# Patient Record
Sex: FEMALE | Race: WHITE | Employment: FULL TIME | URBAN - METROPOLITAN AREA
[De-identification: names, ages, dates, MRNs, and addresses within clinical notes are randomized per-mention and may not be internally consistent; named-entity substitution may affect disease eponyms.]

---

## 2017-06-18 ENCOUNTER — APPOINTMENT (OUTPATIENT)
Dept: CT IMAGING | Age: 52
DRG: 392 | End: 2017-06-18
Attending: EMERGENCY MEDICINE
Payer: COMMERCIAL

## 2017-06-18 ENCOUNTER — HOSPITAL ENCOUNTER (INPATIENT)
Age: 52
LOS: 5 days | Discharge: HOME | DRG: 392 | End: 2017-06-23
Attending: EMERGENCY MEDICINE | Admitting: INTERNAL MEDICINE
Payer: COMMERCIAL

## 2017-06-18 DIAGNOSIS — K57.20 DIVERTICULITIS OF LARGE INTESTINE WITH PERFORATION AND ABSCESS WITHOUT BLEEDING: Primary | ICD-10-CM

## 2017-06-18 PROBLEM — D72.829 LEUKOCYTOSIS: Status: ACTIVE | Noted: 2017-06-18

## 2017-06-18 PROBLEM — R10.32 ABDOMINAL PAIN, ACUTE, LEFT LOWER QUADRANT: Status: ACTIVE | Noted: 2017-06-18

## 2017-06-18 PROBLEM — K57.32 DIVERTICULITIS OF SIGMOID COLON: Status: ACTIVE | Noted: 2017-06-18

## 2017-06-18 LAB
ANION GAP BLD CALC-SCNC: 12 MMOL/L (ref 5–15)
APPEARANCE UR: CLEAR
BACTERIA URNS QL MICRO: NEGATIVE /HPF
BILIRUB UR QL: NEGATIVE
BUN SERPL-MCNC: 9 MG/DL (ref 6–20)
BUN/CREAT SERPL: 9 (ref 12–20)
CALCIUM SERPL-MCNC: 8.6 MG/DL (ref 8.5–10.1)
CHLORIDE SERPL-SCNC: 102 MMOL/L (ref 97–108)
CO2 SERPL-SCNC: 24 MMOL/L (ref 21–32)
COLOR UR: ABNORMAL
CREAT SERPL-MCNC: 0.98 MG/DL (ref 0.55–1.02)
EPITH CASTS URNS QL MICRO: ABNORMAL /LPF
GLUCOSE SERPL-MCNC: 91 MG/DL (ref 65–100)
GLUCOSE UR STRIP.AUTO-MCNC: NEGATIVE MG/DL
HGB UR QL STRIP: ABNORMAL
HYALINE CASTS URNS QL MICRO: ABNORMAL /LPF (ref 0–5)
KETONES UR QL STRIP.AUTO: 40 MG/DL
LEUKOCYTE ESTERASE UR QL STRIP.AUTO: NEGATIVE
NITRITE UR QL STRIP.AUTO: NEGATIVE
PH UR STRIP: 6 [PH] (ref 5–8)
POTASSIUM SERPL-SCNC: 3.5 MMOL/L (ref 3.5–5.1)
PROT UR STRIP-MCNC: NEGATIVE MG/DL
RBC #/AREA URNS HPF: ABNORMAL /HPF (ref 0–5)
SODIUM SERPL-SCNC: 138 MMOL/L (ref 136–145)
SP GR UR REFRACTOMETRY: 1.01 (ref 1–1.03)
UROBILINOGEN UR QL STRIP.AUTO: 0.2 EU/DL (ref 0.2–1)
WBC URNS QL MICRO: ABNORMAL /HPF (ref 0–4)

## 2017-06-18 PROCEDURE — 74177 CT ABD & PELVIS W/CONTRAST: CPT

## 2017-06-18 PROCEDURE — 99283 EMERGENCY DEPT VISIT LOW MDM: CPT

## 2017-06-18 PROCEDURE — 80048 BASIC METABOLIC PNL TOTAL CA: CPT | Performed by: EMERGENCY MEDICINE

## 2017-06-18 PROCEDURE — 96360 HYDRATION IV INFUSION INIT: CPT

## 2017-06-18 PROCEDURE — 74011000258 HC RX REV CODE- 258: Performed by: EMERGENCY MEDICINE

## 2017-06-18 PROCEDURE — 96361 HYDRATE IV INFUSION ADD-ON: CPT

## 2017-06-18 PROCEDURE — 74011000258 HC RX REV CODE- 258: Performed by: INTERNAL MEDICINE

## 2017-06-18 PROCEDURE — 81001 URINALYSIS AUTO W/SCOPE: CPT | Performed by: EMERGENCY MEDICINE

## 2017-06-18 PROCEDURE — 77030032490 HC SLV COMPR SCD KNE COVD -B

## 2017-06-18 PROCEDURE — 65270000029 HC RM PRIVATE

## 2017-06-18 PROCEDURE — 36415 COLL VENOUS BLD VENIPUNCTURE: CPT | Performed by: EMERGENCY MEDICINE

## 2017-06-18 PROCEDURE — 94762 N-INVAS EAR/PLS OXIMTRY CONT: CPT

## 2017-06-18 PROCEDURE — 74011250636 HC RX REV CODE- 250/636: Performed by: INTERNAL MEDICINE

## 2017-06-18 PROCEDURE — 74011250636 HC RX REV CODE- 250/636: Performed by: EMERGENCY MEDICINE

## 2017-06-18 PROCEDURE — 74011636320 HC RX REV CODE- 636/320: Performed by: EMERGENCY MEDICINE

## 2017-06-18 RX ORDER — ACETAMINOPHEN 325 MG/1
650 TABLET ORAL
Status: DISCONTINUED | OUTPATIENT
Start: 2017-06-18 | End: 2017-06-23 | Stop reason: HOSPADM

## 2017-06-18 RX ORDER — MORPHINE SULFATE 2 MG/ML
2 INJECTION, SOLUTION INTRAMUSCULAR; INTRAVENOUS
Status: DISCONTINUED | OUTPATIENT
Start: 2017-06-18 | End: 2017-06-23 | Stop reason: HOSPADM

## 2017-06-18 RX ORDER — SODIUM CHLORIDE 0.9 % (FLUSH) 0.9 %
5-10 SYRINGE (ML) INJECTION AS NEEDED
Status: DISCONTINUED | OUTPATIENT
Start: 2017-06-18 | End: 2017-06-23 | Stop reason: HOSPADM

## 2017-06-18 RX ORDER — LEVONORGESTREL AND ETHINYL ESTRADIOL 0.1-0.02MG
1 KIT ORAL DAILY
COMMUNITY

## 2017-06-18 RX ORDER — DIPHENHYDRAMINE HYDROCHLORIDE 50 MG/ML
12.5 INJECTION, SOLUTION INTRAMUSCULAR; INTRAVENOUS
Status: DISCONTINUED | OUTPATIENT
Start: 2017-06-18 | End: 2017-06-23 | Stop reason: HOSPADM

## 2017-06-18 RX ORDER — MORPHINE SULFATE 2 MG/ML
2 INJECTION, SOLUTION INTRAMUSCULAR; INTRAVENOUS
Status: COMPLETED | OUTPATIENT
Start: 2017-06-18 | End: 2017-06-18

## 2017-06-18 RX ORDER — PROCHLORPERAZINE EDISYLATE 5 MG/ML
5 INJECTION INTRAMUSCULAR; INTRAVENOUS
Status: DISCONTINUED | OUTPATIENT
Start: 2017-06-18 | End: 2017-06-23 | Stop reason: HOSPADM

## 2017-06-18 RX ORDER — SODIUM CHLORIDE 0.9 % (FLUSH) 0.9 %
5-10 SYRINGE (ML) INJECTION EVERY 8 HOURS
Status: DISCONTINUED | OUTPATIENT
Start: 2017-06-18 | End: 2017-06-23 | Stop reason: HOSPADM

## 2017-06-18 RX ORDER — ONDANSETRON 2 MG/ML
4 INJECTION INTRAMUSCULAR; INTRAVENOUS
Status: COMPLETED | OUTPATIENT
Start: 2017-06-18 | End: 2017-06-18

## 2017-06-18 RX ORDER — POTASSIUM CHLORIDE AND SODIUM CHLORIDE 900; 300 MG/100ML; MG/100ML
INJECTION, SOLUTION INTRAVENOUS CONTINUOUS
Status: DISCONTINUED | OUTPATIENT
Start: 2017-06-18 | End: 2017-06-20

## 2017-06-18 RX ORDER — LORAZEPAM 1 MG/1
1 TABLET ORAL
Status: DISCONTINUED | OUTPATIENT
Start: 2017-06-18 | End: 2017-06-23 | Stop reason: HOSPADM

## 2017-06-18 RX ORDER — NALOXONE HYDROCHLORIDE 0.4 MG/ML
0.4 INJECTION, SOLUTION INTRAMUSCULAR; INTRAVENOUS; SUBCUTANEOUS AS NEEDED
Status: DISCONTINUED | OUTPATIENT
Start: 2017-06-18 | End: 2017-06-23 | Stop reason: HOSPADM

## 2017-06-18 RX ADMIN — Medication 2 MG: at 20:24

## 2017-06-18 RX ADMIN — Medication 2 MG: at 12:13

## 2017-06-18 RX ADMIN — ONDANSETRON 4 MG: 2 INJECTION INTRAMUSCULAR; INTRAVENOUS at 12:13

## 2017-06-18 RX ADMIN — PIPERACILLIN SODIUM,TAZOBACTAM SODIUM 3.38 G: 3; .375 INJECTION, POWDER, FOR SOLUTION INTRAVENOUS at 18:05

## 2017-06-18 RX ADMIN — PIPERACILLIN SODIUM,TAZOBACTAM SODIUM 3.38 G: 3; .375 INJECTION, POWDER, FOR SOLUTION INTRAVENOUS at 12:16

## 2017-06-18 RX ADMIN — Medication 10 ML: at 20:28

## 2017-06-18 RX ADMIN — Medication 10 ML: at 14:00

## 2017-06-18 RX ADMIN — SODIUM CHLORIDE 1000 ML: 900 INJECTION, SOLUTION INTRAVENOUS at 10:09

## 2017-06-18 RX ADMIN — SODIUM CHLORIDE AND POTASSIUM CHLORIDE: 9; 2.98 INJECTION, SOLUTION INTRAVENOUS at 14:08

## 2017-06-18 RX ADMIN — IOPAMIDOL 100 ML: 755 INJECTION, SOLUTION INTRAVENOUS at 11:20

## 2017-06-18 RX ADMIN — Medication 2 MG: at 14:15

## 2017-06-18 NOTE — ED PROVIDER NOTES
HPI Comments: 47 y.o. female with no significant past medical history who presents from urgent care clinic via private vehicle with chief complaint of lower abdominal pain. Pt reports that her Sx started 3 days ago with lower abdominal pain with urination that has progressively worsened to a constant aching lower abdominal pain that is worse with urination, movement, and coughing. Pt went to an urgent clini earlier today and was found to have an elevated WBC and was sent to the ED for further evaluation. Pt denies fever, chills, nausea, vomiting, diarrhea, constipation, CP, SOB, cough. There are no other acute medical concerns at this time. Note written by chin García, as dictated by Ary Aguilar MD 10:07 AM          The history is provided by the patient. No past medical history on file. No past surgical history on file. No family history on file. Social History     Social History    Marital status: N/A     Spouse name: N/A    Number of children: N/A    Years of education: N/A     Occupational History    Not on file. Social History Main Topics    Smoking status: Not on file    Smokeless tobacco: Not on file    Alcohol use Not on file    Drug use: Not on file    Sexual activity: Not on file     Other Topics Concern    Not on file     Social History Narrative         ALLERGIES: Review of patient's allergies indicates no known allergies. Review of Systems   Constitutional: Negative for chills and fever. HENT: Negative for ear pain and sore throat. Eyes: Negative for photophobia and pain. Respiratory: Negative for chest tightness and shortness of breath. Cardiovascular: Negative for chest pain and leg swelling. Gastrointestinal: Positive for abdominal pain. Negative for nausea and vomiting. Genitourinary: Positive for dysuria. Negative for flank pain. Musculoskeletal: Negative for back pain and neck pain. Skin: Negative for rash and wound. Neurological: Negative for dizziness, light-headedness and headaches. Vitals:    06/18/17 0951   BP: 136/81   Pulse: 97   Resp: 14   Temp: 98 °F (36.7 °C)   SpO2: 99%   Weight: 92.5 kg (204 lb)   Height: 5' 9\" (1.753 m)            Physical Exam   Constitutional: She is oriented to person, place, and time. She appears well-developed and well-nourished. No distress. HENT:   Head: Normocephalic and atraumatic. Mouth/Throat: Oropharynx is clear and moist.   Eyes: Conjunctivae and EOM are normal. Pupils are equal, round, and reactive to light. Neck: Normal range of motion. Cardiovascular: Normal rate, regular rhythm, normal heart sounds and intact distal pulses. No murmur heard. Pulmonary/Chest: Effort normal and breath sounds normal. No stridor. No respiratory distress. Abdominal: Soft. Bowel sounds are normal. There is tenderness. There is no rebound and no guarding. Musculoskeletal: Normal range of motion. She exhibits no edema, tenderness or deformity. Neurological: She is alert and oriented to person, place, and time. No cranial nerve deficit. Skin: Skin is warm and dry. She is not diaphoretic. Psychiatric: She has a normal mood and affect. Nursing note and vitals reviewed. MDM  Number of Diagnoses or Management Options  Diverticulitis of large intestine with perforation and abscess without bleeding:   Diagnosis management comments: Patient with worsening lower abdominal pain - possible UTI, though concern for appendicitis exists. Check BMP and get CT of abdomen. Check UA as well. Patient declined analgesia at this time.     CT shows likely perforated diverticulitis with abscess - admit for IV ABX and further care       Amount and/or Complexity of Data Reviewed  Clinical lab tests: reviewed and ordered  Tests in the radiology section of CPT®: ordered and reviewed  Decide to obtain previous medical records or to obtain history from someone other than the patient: yes ([facility] labs show WBC of 17K)  Discuss the patient with other providers: yes (Dr. Oleg Zamarripa - radiology  Dr. Chris Robertson and Dr. Leonor Dowell of hospitalist)  Independent visualization of images, tracings, or specimens: yes    Risk of Complications, Morbidity, and/or Mortality  Presenting problems: high  Diagnostic procedures: high  Management options: high    Patient Progress  Patient progress: stable    ED Course       Procedures    CT ABD PELV W CONT  Clinical history: Right lower quadrant abdominal pain     INDICATION: Right lower quadrant abdominal pain , per electronic medical record  Pt states having low abd pain since Thursday and was sent from [facility] for  appendicitis. Denies any nausea, vomiting, or diarrhea.     COMPARISON: None     TECHNIQUE:   Following the uneventful intravenous administration of 100 cc Isovue-370, thin  axial images were obtained through the abdomen and pelvis. Coronal and sagittal  reconstructions were generated. Oral contrast was not administered. CT dose  reduction was achieved through use of a standardized protocol tailored for this  examination and automatic exposure control for dose modulation.     FINDINGS:   There is inflammatory stranding in the deep pelvis with sigmoid colonic wall  thickening associated. Partially loculated fluid in the deep pelvis 29 x 33 mm. Not amenable to percutaneous drainage. Inflammatory changes in the right lower  quadrant are less conspicuous.     Cholelithiasis. Fecal stasis. The appendix is not definitely visualized but secondary signs of acute  appendicitis are not clearly demonstrated. Minimal hydroureter and urothelial  enhancement on the right, likely reactive. LIVER: No mass or biliary dilatation. GALLBLADDER: Unremarkable. SPLEEN: No mass. PANCREAS: No mass or ductal dilatation. ADRENALS: Unremarkable. KIDNEYS: No mass, calculus, or hydronephrosis. STOMACH: Unremarkable. SMALL BOWEL: No dilatation or wall thickening.   PERITONEUM: No ascites or pneumoperitoneum. RETROPERITONEUM: No lymphadenopathy or aortic aneurysm. URINARY BLADDER: No mass or calculus. BONES: No destructive bone lesion.        IMPRESSION  IMPRESSION:.         Inflammatory stranding in the deep pelvis adjacent to the sigmoid colon with  colonic wall thickening and minimal loculated fluid in the deep pelvis. Findings  are most consistent with sigmoid colonic diverticulitis.     Inflammatory changes in the right lower quadrant are less conspicuous. The appendix is not clearly visualized.

## 2017-06-18 NOTE — CONSULTS
Surgery Consult    Subjective:      Jazmin Mai is a 47 y.o. female who I was asked to evaluate for LLQ abd pain. She has had this for about three days . [facility] and had an elevated white count and was sent to the ER, She had a CT scan which showed sigmoid diverticulitis and she has been admitted for IV antibiotics   Patient denies any pain previously, and no previous colonoscopy,     No past medical history on file. No past surgical history on file. No family history on file.   Social History     Social History    Marital status:      Spouse name: N/A    Number of children: N/A    Years of education: N/A     Social History Main Topics    Smoking status: Never Smoker    Smokeless tobacco: Not on file    Alcohol use Yes    Drug use: Not on file    Sexual activity: Not on file     Other Topics Concern    Not on file     Social History Narrative    No narrative on file      Current Facility-Administered Medications   Medication Dose Route Frequency    sodium chloride (NS) flush 5-10 mL  5-10 mL IntraVENous Q8H    sodium chloride (NS) flush 5-10 mL  5-10 mL IntraVENous PRN    acetaminophen (TYLENOL) tablet 650 mg  650 mg Oral Q4H PRN    morphine injection 2 mg  2 mg IntraVENous Q4H PRN    naloxone (NARCAN) injection 0.4 mg  0.4 mg IntraVENous PRN    diphenhydrAMINE (BENADRYL) injection 12.5 mg  12.5 mg IntraVENous Q4H PRN    prochlorperazine (COMPAZINE) injection 5 mg  5 mg IntraVENous Q8H PRN    LORazepam (ATIVAN) tablet 1 mg  1 mg Oral BID PRN    piperacillin-tazobactam (ZOSYN) 3.375 g in 0.9% sodium chloride (MBP/ADV) 100 mL  3.375 g IntraVENous Q8H    0.9% sodium chloride with KCl 40 mEq/L infusion   IntraVENous CONTINUOUS      Allergies no known allergies    Review of Systems:REVIEW OF SYSTEMS:     []     Unable to obtain  ROS due to  []    mental status change  []    sedated   []    intubated   [x]    Total of 12 systems reviewed as follows:  Constitutional: negative fever, negative chills, negative weight loss  Eyes:   negative visual changes  ENT:   negative sore throat, tongue or lip swelling  Respiratory:  negative cough, negative dyspnea  Cards:  negative for chest pain, palpitations, lower extremity edema  GI:   negative for nausea, vomiting, diarrhea, and positive for abdominal pain  :  negative for frequency, dysuria  Integument:  negative for rash and pruritus  Heme:  negative for easy bruising and gum/nose bleeding  Musculoskel: negative for myalgias,  back pain and muscle weakness  Neuro:  negative for headaches, dizziness, vertigo  Psych:  negative for feelings of anxiety, depression     Objective:      Patient Vitals for the past 8 hrs:   BP Temp Pulse Resp SpO2   17 1546 118/62 98 °F (36.7 °C) 93 18 98 %   17 1310 126/73 98.3 °F (36.8 °C) 91 - 100 %   17 1245 124/70 - - - 98 %   17 1230 127/72 - - - 98 %   17 1218 116/77 - - - 97 %       Temp (24hrs), Av.1 °F (36.7 °C), Min:98 °F (36.7 °C), Max:98.3 °F (36.8 °C)      Physical Exam:  General:  Alert, cooperative, no distress, appears stated age. Eyes:  Conjunctivae/corneas clear. PERRL, EOMs intact. Nose: Nares normal. Septum midline. Mucosa normal. No drainage or sinus tenderness. Mouth/Throat: Lips, mucosa, and tongue normal. Teeth and gums normal.   Neck: Supple, symmetrical, trachea midline, no adenopathy, thyroid: no enlargment/tenderness/nodules, no carotid bruit and no JVD. Back:   Symmetric, no curvature. ROM normal. No CVA tenderness. Lungs:   Clear to auscultation bilaterally. Heart:  Regular rate and rhythm, S1, S2 normal, no murmur, click, rub or gallop. Abdomen:   Soft, tender in LLQ with no guarding or rebound. Bowel sounds normal. No masses,  No organomegaly. Extremities: Extremities normal, atraumatic, no cyanosis or edema. Pulses: 2+ and symmetric all extremities.    Skin: Skin color, texture, turgor normal. No rashes or lesions   Lymph nodes: Cervical, supraclavicular, and axillary nodes normal.     Labs: No results for input(s): WBC, HGB, HCT, PLT, HGBEXT, HCTEXT, PLTEXT in the last 72 hours. Recent Labs      06/18/17   1005   NA  138   K  3.5   CL  102   CO2  24   GLU  91   BUN  9   CREA  0.98   CA  8.6     No results for input(s): INR in the last 72 hours. No lab exists for component: INREXT      Assessment:     Findings and CT scan consistent with diverticulitis with no drainable abscess   Has some fluid in the pelvis which could be inflammatory    Plan:     No indication for surgery currently  Pelvic fluid collection does not need drainage  I have discussed current management and long term care once she leaves hospital   She will need a colonoscopy when she has recovered from this acute episode.     Signed By: Donah Cogan, MD     June 18, 2017

## 2017-06-18 NOTE — ROUTINE PROCESS
Bedside and Verbal shift change report given to Clayton Whitehead (oncoming nurse) by Roxanne Nguyen RN (offgoing nurse). Report included the following information SBAR, Kardex and Recent Results.

## 2017-06-18 NOTE — ED TRIAGE NOTES
Pt states having low abd pain since Thursday and was sent from [facility] for appendicitis. Denies any nausea, vomiting, or diarrhea.

## 2017-06-18 NOTE — PROGRESS NOTES
[facility]: MED RECONCILIATION    Comments/Recommendations:   Patient has her birth control with her. Medication(s) ADDED to PTA list:  1. none    Medication(s) REMOVED from PTA list:  1. none    Medication(s) ADJUSTED on PTA list:  1. none      Information obtained from: Rx Query/Patient    Significant PMH/Disease States: No past medical history on file. Chief Complaint for this Admission:   Chief Complaint   Patient presents with    Abdominal Pain         Allergies: Review of patient's allergies indicates no known allergies. Prior to Admission Medications:     Medication Documentation Review Audit       Reviewed by Jazlyn Iqbal PHARMD (Pharmacist) on 06/18/17 at 1344         Medication Sig Documenting Provider Last Dose Status Taking?      levonorgestrel-ethinyl estradiol (VIENVA) 0.1-20 mg-mcg tab Take 1 Tab by mouth daily.  Historical Provider 6/17/2017 am Active Yes                        ABHISHEK Enriquez   Contact: [phone]

## 2017-06-18 NOTE — IP AVS SNAPSHOT
Yumiko Anderson 
 
 
 [address] 
[phone] Patient: Kusum Grey MRN: [id number] :1965 You are allergic to the following No active allergies Recent Documentation Height Weight BMI OB Status Smoking Status 1.753 m 92.5 kg 30.13 kg/m2 Having regular periods Never Smoker Emergency Contacts Name Discharge Info Relation Home Work Antonio Lemus   [phone] About your hospitalization You were admitted on:  2017 You last received care in the:  [facility] 4M POST SURG ORT 1 You were discharged on:  2017 Unit phone number:  [phone] Why you were hospitalized Your primary diagnosis was:  Diverticulitis Of Sigmoid Colon Your diagnoses also included:  Abdominal Pain, Acute, Left Lower Quadrant, Leukocytosis Providers Seen During Your Hospitalizations Provider Role Specialty Primary office phone Marley Sharma MD Attending Provider Emergency Medicine [phone] Yanci Kim MD Attending Provider Internal Medicine [phone] Milli Watson MD Attending Provider Internal Medicine [phone] Miguel Ángel Liang MD Attending Provider Internal Medicine [phone] Your Primary Care Physician (PCP) Primary Care Physician Office Phone Office Fax Olga [facility] [phone] Follow-up Information Follow up With Details Comments Contact Info Nicholas Waters MD  Call to schedule a routine colonoscopy after recovery from current illness Mariluz Perales [address] [place] [address] 
[phone] Denia Jean MD   [address] [address] 
[phone] Nicholas Waters MD Schedule an appointment as soon as possible for a visit in 5 weeks for colonoscopy 217 [facility] [phone] [address] 
[phone] Current Discharge Medication List  
  
START taking these medications Dose & Instructions Dispensing Information Comments Morning Noon Evening Bedtime  
 amoxicillin-clavulanate 875-125 mg per tablet Commonly known as:  AUGMENTIN Your last dose was: Your next dose is:    
   
   
 Dose:  1 Tab Take 1 Tab by mouth every twelve (12) hours for 2 days. Quantity:  4 Tab Refills:  0 CONTINUE these medications which have NOT CHANGED Dose & Instructions Dispensing Information Comments Morning Noon Evening Bedtime VIENVA 0.1-20 mg-mcg Tab Generic drug:  levonorgestrel-ethinyl estradiol Your last dose was: Your next dose is:    
   
   
 Dose:  1 Tab Take 1 Tab by mouth daily. Refills:  0 Where to Get Your Medications Information on where to get these meds will be given to you by the nurse or doctor. ! Ask your nurse or doctor about these medications  
  amoxicillin-clavulanate 875-125 mg per tablet Discharge Instructions Patient Discharge Instructions Robert Brizuela / [id number] : 1965 Admitted 2017 Discharged: 2017 Primary Diagnoses Problem List as of 2017  Never Reviewed Codes Class Noted - Resolved * (Principal)Diverticulitis of sigmoid colon Abdominal pain, acute, left lower quadrant Leukocytosis Take Home Medications · It is important that you take the medication exactly as they are prescribed. · Keep your medication in the bottles provided by the pharmacist and keep a list of the medication names, dosages, and times to be taken in your wallet. · Do not take other medications without consulting your doctor. What to do at [facility] Recommended diet: Increase noncaffeinated fluids and bland for 2 weeks Recommended activity: Activity as tolerated If you experience worse pain or other symptoms, please follow up with GI or go to ER. Follow-up with your PCP in a few weeks, and GI in 6 weeks Diverticulitis: Care Instructions Your Care Instructions Diverticulitis occurs when pouches form in the wall of the colon and become inflamed or infected. It can be very painful. Doctors aren't sure what causes diverticulitis. There is no proof that foods such as nuts, seeds, or berries cause it or make it worse. A low-fiber diet may cause the colon to work harder to push stool forward. Pouches may form because of this extra work. It may be hard to think about healthy eating while you're in pain. But as you recover, you might think about how you can use healthy eating for overall better health. Healthy eating may help you avoid future attacks. Follow-up care is a key part of your treatment and safety. Be sure to make and go to all appointments, and call your doctor if you are having problems. It's also a good idea to know your test results and keep a list of the medicines you take. How can you care for yourself at home? · Drink plenty of fluids, enough so that your urine is light yellow or clear like water. If you have kidney, heart, or liver disease and have to limit fluids, talk with your doctor before you increase the amount of fluids you drink. · Stick to liquids or a bland diet (plain rice, bananas, dry toast or crackers, applesauce) until you are feeling better. Then you can return to regular foods and gradually increase the amount of fiber in your diet. · Use a heating pad set on low on your belly to relieve mild cramps and pain. · Get extra rest until you are feeling better. · Be safe with medicines. Read and follow all instructions on the label. ¨ If the doctor gave you a prescription medicine for pain, take it as prescribed. ¨ If you are not taking a prescription pain medicine, ask your doctor if you can take an over-the-counter medicine. · If your doctor prescribed antibiotics, take them as directed. Do not stop taking them just because you feel better. You need to take the full course of antibiotics. To prevent future attacks of diverticulitis · Avoid constipation: 
¨ Include fruits, vegetables, beans, and whole grains in your diet each day. These foods are high in fiber. ¨ Drink plenty of fluids, enough so that your urine is light yellow or clear like water. If you have kidney, heart, or liver disease and have to limit fluids, talk with your doctor before you increase the amount of fluids you drink. ¨ Get some exercise every day. Build up slowly to 30 to 60 minutes a day on 5 or more days of the week. ¨ Take a fiber supplement, such as Citrucel or Metamucil, every day if needed. Read and follow all instructions on the label. ¨ Schedule time each day for a bowel movement. Having a daily routine may help. Take your time and do not strain when having a bowel movement. When should you call for help? Call 911 anytime you think you may need emergency care. For example, call if: 
· You passed out (lost consciousness). · You vomit blood or what looks like coffee grounds. · You pass maroon or very bloody stools. Call your doctor now or seek immediate medical care if: 
· You have severe pain or swelling in your belly. · You have a new or higher fever. · You cannot keep down fluids or medicines. · You have new pain that gets worse when you move or cough. Watch closely for changes in your health, and be sure to contact your doctor if: · The symptoms you had when you first started feeling sick come back. · You do not get better as expected. Where can you learn more? Go to http://jeanette-albaro.info/. Enter H901 in the search box to learn more about \"Diverticulitis: Care Instructions. \" Current as of: August 9, 2016 Content Version: 11.3 © 5692-4746 [employer].  Care instructions adapted under license by [address] (which disclaims liability or warranty for this information). If you have questions about a medical condition or this instruction, always ask your healthcare professional. [address] any warranty or liability for your use of this information. Information obtained by : 
I understand that if any problems occur once I am at home I am to contact my physician. I understand and acknowledge receipt of the instructions indicated above. Physician's or R.N.'s Signature                                                                  Date/Time Patient or Representative Signature                                                          Date/Time Discharge Orders None [facility] Announcement We are excited to announce that we are making your provider's discharge notes available to you in [facility]. You will see these notes when they are completed and signed by the physician that discharged you from your recent hospital stay. If you have any questions or concerns about any information you see in [facility], please call the Health Information Department where you were seen or reach out to your Primary Care Provider for more information about your plan of care. Introducing [facility]! Wai Tripp introduces [facility] patient portal. Now you can access parts of your medical record, email your doctor's office, and request medication refills online. 1. In your internet browser, go to [email or web address] 2. Click on the First Time User? Click Here link in the Sign In box. You will see the New Member Sign Up page. 3. Enter your [employer] Access Code exactly as it appears below. You will not need to use this code after youve completed the sign-up process. If you do not sign up before the expiration date, you must request a new code. · [employer] Access Code: [email or web address] Expires: 9/17/2017 10:32 AM 
 
4. Enter the last four digits of your Social Security Number (xxxx) and Date of Birth (mm/dd/yyyy) as indicated and click Submit. You will be taken to the next sign-up page. 5. Create a [employer] ID. This will be your [employer] login ID and cannot be changed, so think of one that is secure and easy to remember. 6. Create a [employer] password. You can change your password at any time. 7. Enter your Password Reset Question and Answer. This can be used at a later time if you forget your password. 8. Enter your e-mail address. You will receive e-mail notification when new information is available in [address]. 9. Click Sign Up. You can now view and download portions of your medical record. 10. Click the Download Summary menu link to download a portable copy of your medical information. If you have questions, please visit the Frequently Asked Questions section of the [employer] website. Remember, [employer] is NOT to be used for urgent needs. For medical emergencies, dial 911. Now available from your iPhone and Android! General Information Please provide this summary of care documentation to your next provider. Patient Signature:  ____________________________________________________________ Date:  ____________________________________________________________  
  
Josh Hai Provider Signature:  ____________________________________________________________ Date:  ____________________________________________________________

## 2017-06-18 NOTE — ROUTINE PROCESS
Primary Nurse Nanette Julio RN and Harley RN performed a dual skin assessment on this patient No impairment noted  Shawn score is 23

## 2017-06-18 NOTE — H&P
[facility]  [address] [facility], [facility] [address]  [phone]    Admission History and Physical      NAME:              Tung Farrell   :   1965   MRN:  [id number]     PCP:  Aide Alvarado MD     Date:     2017     Chief  Complaint: Abdominal pain    History Of Presenting Illness:       Ms. Sang Cramer is a 47 y.o. female who is being admitted for acute diverticulitis of sigmoid colon. Ms. Sang Cramer presented to our Emergency Department today complaining of a moderately severe left lower aching and persistent abdominal pain for three days. Worse with coughing. Relieved by IV morphine given in the ED. No vomiting. Seen an an Urgent care and her WBC count was 17,000. An abdominal CT scan done here showed acute diverticulitis with a possible abscess. The patient will be admitted to hospital for further management. Allergies no known allergies    Prior to Admission medications    Medication Sig Start Date End Date Taking? Authorizing Provider   OTHER Indications: birth control   Yes Phys Other, MD     Past medical history: neg for DM, HTN, hyperlipidemia or any chronic illness     Past surgical history: none     Social History   Substance Use Topics    Smoking status: Never smoked     Smokeless tobacco: Not on file    Alcohol use No alcohol intake      Family history: Mother had a hx of diverticulitis. No hx oc cancer     Review of Systems:    Constitutional ROS: no fever, chills, rigors or night sweats  Respiratory ROS: no cough, sputum, hemoptysis, dyspnea or pleuritic pain. Cardiovascular ROS: no chest pain, palpitations, orthopnea, PND or syncope  Endocrine ROS: no polydispsia, polyuria, heat or cold intolerance or major weight change.   Gastrointestinal ROS: no dysphagia, nausea, vomiting or diarrhea    Genito-Urinary ROS: + dysuria, + frequency, hematuria, retention or flank pain  Musculoskeletal ROS: no joint pain, swelling or muscular tenderness  Neurological ROS: no headache, confusion, focal weakness or any other neurological symptoms  Psychiatric ROS: no depression, anxiety, mood swings  Dermatological ROS: no rash, pruritis, or urticaria  Heme-Lymph ROS: no swollen glands, bleeding    Examination:    Constitutional:    Visit Vitals    /81 (BP 1 Location: Right arm, BP Patient Position: Sitting)    Pulse 97    Temp 98 °F (36.7 °C)    Resp 14    Ht 5' 9\" (1.753 m)    Wt 92.5 kg (204 lb)    LMP 06/04/2017    SpO2 99%    BMI 30.13 kg/m2         General:  Weak and ill looking patient in no acute distress  Eyes: Pink conjunctivae, PERRLA with no discharge. Normal eye movements  Ear, Nose, Mouth & Throat: No ottorrhea, rhinorrhea, non tender sinuses, dry mucous membranes  Respiratory:  No accessory muscle use, clear breath sounds without crackles or wheezes  Cardiovascular:  No JVD or murmurs, regular and normal S1, S2 without thrills, bruits or peripheral edema. Capillary refil+, good distal pulses  GI & :  Soft abdomen, lower abdominal tenderness, rebound with some guarding. Normoactive bowel sounds with no palpable organomegaly  Heme:  No cervical or axillary adenopathy. Musculoskeletal:  No cyanosis, clubbing, atrophy or deformities  Skin:  No rashes, bruising or ulcers   Neurological: Awake and alert, speech is clear, CNs 2-12 are grossly intact and otherwise non focal  Psychiatric:  Has a good insight and is oriented x 3  ________________________________________________________________________    Data Review:    Labs:    No results for input(s): WBC, HGB, HCT, PLT, HGBEXT, HCTEXT, PLTEXT in the last 72 hours.   Recent Labs      06/18/17   1005   NA  138   K  3.5   CL  102   CO2  24   GLU  91   BUN  9   CREA  0.98   CA  8.6     No components found for: GLPOC  No results for input(s): PH, PCO2, PO2, HCO3, FIO2 in the last 72 hours. No results for input(s): INR in the last 72 hours. No lab exists for component: INREXT    Radiological Studies:      Personally reviewed CT scan abdomen and pelvis - Inflammatory stranding in the deep pelvis adjacent to the sigmoid colon with  colonic wall thickening and minimal loculated fluid in the deep pelvis. Findings are most consistent with sigmoid colonic diverticulitis. Inflammatory changes in the right lower quadrant are less conspicuous. Appendix is not clearly visualized. I have also reviewed available labs from Urgent care      Assessment & Impression:     Ms. Guevara Nielsen is a 47 y.o. female being evaluated for:     Principal Problem:    Diverticulitis of sigmoid colon (6/18/2017)    Active Problems:    Abdominal pain, acute, left lower quadrant (6/18/2017)      Leukocytosis (6/18/2017)         Plan of management:    Diverticulitis of sigmoid colon (0/07/3894): likely complicated with abscess formation. Admit to hospital. Keep NPO. Hydrate with IVFs. IV Morphine. Start IV Zosyn and consult general surgery    Abdominal pain, acute, left lower quadrant (6/18/2017): likely from diverticulitis. Treat as above and follow clinically    Leukocytosis (6/18/2017): due to diverticulitis. NOT severe sepsis. Repeat labs in AM. Monitor clinically    Hypokalemia POA: borderline low K+.  Replete with IVFs     Code Status:  Full    Surrogate decision maker: Family    Risk of deterioration: high      Total time spent for the care of the patient: [address] [facility] discussed with: Patient, Family, Nursing Staff and Dr Katarina Marie    Discussed:  Code Status, Care Plan and D/C Planning    Prophylaxis:  SCD's    Probable Disposition:  Home w/Family           ___________________________________________________    Attending Physician: Johnna Holt MD

## 2017-06-18 NOTE — ED NOTES
TRANSFER - OUT REPORT:    Verbal report given to [facility] RN(name) on Donny Almazan  being transferred to [facility](unit) for routine progression of care       Report consisted of patients Situation, Background, Assessment and   Recommendations(SBAR). Information from the following report(s) SBAR was reviewed with the receiving nurse. Lines:   Peripheral IV 06/18/17 Left Antecubital (Active)   Site Assessment Clean, dry, & intact 6/18/2017 10:04 AM   Phlebitis Assessment 0 6/18/2017 10:04 AM   Infiltration Assessment 0 6/18/2017 10:04 AM   Dressing Status Clean, dry, & intact 6/18/2017 10:04 AM        Opportunity for questions and clarification was provided.       Patient transported with:   Ximena Foster

## 2017-06-19 LAB
ALBUMIN SERPL BCP-MCNC: 2.8 G/DL (ref 3.5–5)
ALBUMIN/GLOB SERPL: 0.7 {RATIO} (ref 1.1–2.2)
ALP SERPL-CCNC: 91 U/L (ref 45–117)
ALT SERPL-CCNC: 29 U/L (ref 12–78)
ANION GAP BLD CALC-SCNC: 16 MMOL/L (ref 5–15)
AST SERPL W P-5'-P-CCNC: 18 U/L (ref 15–37)
BASOPHILS # BLD AUTO: 0 K/UL (ref 0–0.1)
BASOPHILS # BLD: 0 % (ref 0–1)
BILIRUB SERPL-MCNC: 1 MG/DL (ref 0.2–1)
BUN SERPL-MCNC: 10 MG/DL (ref 6–20)
BUN/CREAT SERPL: 11 (ref 12–20)
CALCIUM SERPL-MCNC: 8.2 MG/DL (ref 8.5–10.1)
CHLORIDE SERPL-SCNC: 109 MMOL/L (ref 97–108)
CO2 SERPL-SCNC: 19 MMOL/L (ref 21–32)
CREAT SERPL-MCNC: 0.91 MG/DL (ref 0.55–1.02)
EOSINOPHIL # BLD: 0 K/UL (ref 0–0.4)
EOSINOPHIL NFR BLD: 0 % (ref 0–7)
ERYTHROCYTE [DISTWIDTH] IN BLOOD BY AUTOMATED COUNT: 14 % (ref 11.5–14.5)
GLOBULIN SER CALC-MCNC: 3.9 G/DL (ref 2–4)
GLUCOSE SERPL-MCNC: 80 MG/DL (ref 65–100)
HCT VFR BLD AUTO: 37.2 % (ref 35–47)
HGB BLD-MCNC: 12.3 G/DL (ref 11.5–16)
LYMPHOCYTES # BLD AUTO: 13 % (ref 12–49)
LYMPHOCYTES # BLD: 2 K/UL (ref 0.8–3.5)
MCH RBC QN AUTO: 28.2 PG (ref 26–34)
MCHC RBC AUTO-ENTMCNC: 33.1 G/DL (ref 30–36.5)
MCV RBC AUTO: 85.3 FL (ref 80–99)
MONOCYTES # BLD: 1.4 K/UL (ref 0–1)
MONOCYTES NFR BLD AUTO: 9 % (ref 5–13)
NEUTS SEG # BLD: 12.6 K/UL (ref 1.8–8)
NEUTS SEG NFR BLD AUTO: 78 % (ref 32–75)
PLATELET # BLD AUTO: 224 K/UL (ref 150–400)
POTASSIUM SERPL-SCNC: 3.8 MMOL/L (ref 3.5–5.1)
PROT SERPL-MCNC: 6.7 G/DL (ref 6.4–8.2)
RBC # BLD AUTO: 4.36 M/UL (ref 3.8–5.2)
SODIUM SERPL-SCNC: 144 MMOL/L (ref 136–145)
WBC # BLD AUTO: 16.1 K/UL (ref 3.6–11)

## 2017-06-19 PROCEDURE — 74011250636 HC RX REV CODE- 250/636: Performed by: INTERNAL MEDICINE

## 2017-06-19 PROCEDURE — 51798 US URINE CAPACITY MEASURE: CPT

## 2017-06-19 PROCEDURE — 65270000029 HC RM PRIVATE

## 2017-06-19 PROCEDURE — 74011000258 HC RX REV CODE- 258: Performed by: INTERNAL MEDICINE

## 2017-06-19 PROCEDURE — 36415 COLL VENOUS BLD VENIPUNCTURE: CPT | Performed by: INTERNAL MEDICINE

## 2017-06-19 PROCEDURE — 85025 COMPLETE CBC W/AUTO DIFF WBC: CPT | Performed by: INTERNAL MEDICINE

## 2017-06-19 PROCEDURE — 80053 COMPREHEN METABOLIC PANEL: CPT | Performed by: INTERNAL MEDICINE

## 2017-06-19 RX ADMIN — SODIUM CHLORIDE AND POTASSIUM CHLORIDE: 9; 2.98 INJECTION, SOLUTION INTRAVENOUS at 15:48

## 2017-06-19 RX ADMIN — Medication 2 MG: at 12:35

## 2017-06-19 RX ADMIN — Medication 2 MG: at 17:35

## 2017-06-19 RX ADMIN — Medication 10 ML: at 05:00

## 2017-06-19 RX ADMIN — Medication 2 MG: at 08:26

## 2017-06-19 RX ADMIN — PIPERACILLIN SODIUM,TAZOBACTAM SODIUM 3.38 G: 3; .375 INJECTION, POWDER, FOR SOLUTION INTRAVENOUS at 17:34

## 2017-06-19 RX ADMIN — Medication 2 MG: at 00:44

## 2017-06-19 RX ADMIN — PIPERACILLIN SODIUM,TAZOBACTAM SODIUM 3.38 G: 3; .375 INJECTION, POWDER, FOR SOLUTION INTRAVENOUS at 01:52

## 2017-06-19 RX ADMIN — SODIUM CHLORIDE AND POTASSIUM CHLORIDE: 9; 2.98 INJECTION, SOLUTION INTRAVENOUS at 04:52

## 2017-06-19 RX ADMIN — PIPERACILLIN SODIUM,TAZOBACTAM SODIUM 3.38 G: 3; .375 INJECTION, POWDER, FOR SOLUTION INTRAVENOUS at 09:49

## 2017-06-19 NOTE — PROGRESS NOTES
General Surgery Daily Progress Note    Patient: Shoshana Samaniego MRN: [id number]  SSN: [id number]    YOB: 1965  Age: 47 y.o. Sex: female      Admit Date: 6/18/2017    Subjective:   Pain unchanged since admission. Denies N/V. No BM or flatus.      Current Facility-Administered Medications   Medication Dose Route Frequency    sodium chloride (NS) flush 5-10 mL  5-10 mL IntraVENous Q8H    sodium chloride (NS) flush 5-10 mL  5-10 mL IntraVENous PRN    acetaminophen (TYLENOL) tablet 650 mg  650 mg Oral Q4H PRN    morphine injection 2 mg  2 mg IntraVENous Q4H PRN    naloxone (NARCAN) injection 0.4 mg  0.4 mg IntraVENous PRN    diphenhydrAMINE (BENADRYL) injection 12.5 mg  12.5 mg IntraVENous Q4H PRN    prochlorperazine (COMPAZINE) injection 5 mg  5 mg IntraVENous Q8H PRN    LORazepam (ATIVAN) tablet 1 mg  1 mg Oral BID PRN    piperacillin-tazobactam (ZOSYN) 3.375 g in 0.9% sodium chloride (MBP/ADV) 100 mL  3.375 g IntraVENous Q8H    0.9% sodium chloride with KCl 40 mEq/L infusion   IntraVENous CONTINUOUS        Objective:      06/17 1901 - 06/19 0700  In: 618.8 [I.V.:618.8]  Out: -   Patient Vitals for the past 8 hrs:   BP Temp Pulse Resp SpO2   06/19/17 1140 120/70 98.8 °F (37.1 °C) 88 18 99 %   06/19/17 0807 117/75 98.2 °F (36.8 °C) 92 18 98 %   06/19/17 0450 114/73 99.6 °F (37.6 °C) 91 18 96 %       Physical Exam:  General: Alert, cooperative, NAD  Lungs: Unlabored  Heart:  Regular rate and rhythm  Abdomen: Soft, left mid abdominal tenderness, mildly distended. + bowel sounds  Extremities: Warm, moves all, no edema  Skin:  Warm and dry, no rash    Labs: Recent Labs      06/19/17   0454   WBC  16.1*   HGB  12.3   HCT  37.2   PLT  224     Recent Labs      06/19/17   0454   NA  144   K  3.8   CL  109*   CO2  19*   GLU  80   BUN  10   CREA  0.91   CA  8.2*   ALB  2.8*   TBILI  1.0   SGOT  18   ALT  29       Assessment / Plan:   · Acute sigmoid diverticulitis w/ contained perforation  · Continue bowel rest, IVF, ABX  · Would not advance diet until pain has improved  · Ambulate in halls  · No indication for surgery

## 2017-06-19 NOTE — PROGRESS NOTES
NUTRITION   RD Screen & Diet education     Diet: NPO  Body mass index is 30.13 kg/(m^2). Skin: Intact  PO Intake: NPO  No data found. Estimated Daily Nutrition Requirements:  Kcals:  1611-2071 (20-23 kcals/kg x actual BW)                Protein: 74-92 (0.8-1.0 g/kg x actual BW)                Fluid:       Consult received for diet education. MST referral received for EN/TPN PTA.  47 yof admitted for acute diverticulitis. Surgery following - no surgery plan at this time. IV abx and fluids. Currently NPO w/ plans to wait for subsided pain p/t diet advancement. Visited pt this afternoon. Reports improvement in pain. Denied EN/TPN PTA. Baseline appetite is good. Obese per BMI. Provided Low Fiber diet education for diverticulitis symptoms. Follow until pain and inflammation subsides, usually ~3 wks. Provided maintenance diet education - High Fiber. Recommendations and tips for re-introducing fiber back in to diet. Pt receptive and agreeable to diet information. Will continue to monitor plan of care, diet advancement, PO. Nutrition Diagnosis: Alteration in GI function related to alteration in GI tract structure/function as evidenced by diverticulitis dx and NPO  Nutrition Intervention: General, healthful diet; nutrition education  Goal: Advanced diet w/ no s/s of intolerance in the next 1-3 days  Monitoring and Evaluation: diet advancement, PO     RD will continue to monitor and will f/u for further nutrition assessment and intervention as appropriate.     Education & Discharge Needs:   [x] Nutrition related discharge needs addressed:     [] Supplements (on d/c instruction &/or coupons provided)  [x] Education    []No nutrition related discharge needs at this time     Cultural, Jehovah's witness and ethnic food preferences identified    [x]None   [] Yes     Nadia Srinivasan RD

## 2017-06-19 NOTE — PROGRESS NOTES
[address] [place] [address], [place], [address] [place] [address]  [phone]      Medical Progress Note      NAME:         Dio Joshi   :        1965  MRM:        [id number]    Date:          2017      Subjective: Patient has been seen and examined as a follow up for acute diverticulitis. Chart, labs, diagnostics reviewed. She has less aching lower abdominal discomfort but better with pain medications. She denies any fever or chills. No nausea or vomiting. Objective:    Vital Signs:    Visit Vitals    /75 (BP 1 Location: Right arm, BP Patient Position: Head of bed elevated (Comment degrees))    Pulse 92    Temp 98.2 °F (36.8 °C)    Resp 18    Ht 5' 9\" (1.753 m)    Wt 92.5 kg (204 lb)    LMP 2017    SpO2 98%    BMI 30.13 kg/m2        Intake/Output Summary (Last 24 hours) at 17 1141  Last data filed at 17 1717   Gross per 24 hour   Intake           618.75 ml   Output                0 ml   Net           618.75 ml        Physical Examination:    General:   Weak looking and not in any acute distress   Eyes:   pink conjunctivae, PERRLA with no discharge. ENT:   no ottorrhea or rhinorrhea with moist mucous membranes  Neck: no masses, thyroid non-tender and trachea central.  Pulm:  no accessory muscle use, clear breath sounds without crackles or wheezes  Card:  no JVD or murmurs, has regular and normal S1, S2 without thrills, bruits or peripheral edema  Abd:  Soft, lower abdominal discomfort, less guarding, normoactive bowel sounds with no palpable organomegaly  Musc:  No cyanosis, clubbing, atrophy or deformities. Skin:  No rashes, bruising or ulcers. Neuro: Awake and alert.  Generally a non focal exam. Follows commands appropriately  Psych:  Has a good insight and is oriented x 3    Current Facility-Administered Medications   Medication Dose Route Frequency    sodium chloride (NS) flush 5-10 mL  5-10 mL IntraVENous Q8H    sodium chloride (NS) flush 5-10 mL  5-10 mL IntraVENous PRN    acetaminophen (TYLENOL) tablet 650 mg  650 mg Oral Q4H PRN    morphine injection 2 mg  2 mg IntraVENous Q4H PRN    naloxone (NARCAN) injection 0.4 mg  0.4 mg IntraVENous PRN    diphenhydrAMINE (BENADRYL) injection 12.5 mg  12.5 mg IntraVENous Q4H PRN    prochlorperazine (COMPAZINE) injection 5 mg  5 mg IntraVENous Q8H PRN    LORazepam (ATIVAN) tablet 1 mg  1 mg Oral BID PRN    piperacillin-tazobactam (ZOSYN) 3.375 g in 0.9% sodium chloride (MBP/ADV) 100 mL  3.375 g IntraVENous Q8H    0.9% sodium chloride with KCl 40 mEq/L infusion   IntraVENous CONTINUOUS        Laboratory data and review:    Recent Labs      06/19/17   0454   WBC  16.1*   HGB  12.3   HCT  37.2   PLT  224     Recent Labs      06/19/17   0454  06/18/17   1005   NA  144  138   K  3.8  3.5   CL  109*  102   CO2  19*  24   GLU  80  91   BUN  10  9   CREA  0.91  0.98   CA  8.2*  8.6   ALB  2.8*   --    SGOT  18   --    ALT  29   --      No components found for: GLPOC    Assessment and Plan:    Diverticulitis of sigmoid colon (6/18/2017): no abscess as per surgery. Still symptomatic. Continue to keep NPO. Hydrate with IVFs. IV Morphine. Continue IV Zosyn and nutritional education. Will need an out patient colonoscopy once recovered from acute illness.      Abdominal pain, acute, left lower quadrant (6/18/2017): likely from diverticulitis. Treat as above and monitor  clinically     Leukocytosis (6/18/2017): due to diverticulitis. NOT severe sepsis. Monitor clinically     Hypokalemia POA: borderline low K+.  Repleted      Total time spent for the patient's care: [address] discussed with: Patient and Nursing Staff    Discussed:  Care Plan    Prophylaxis:  SCD's    Anticipated Disposition:  Home w/Family           ___________________________________________________    Attending Physician:   Edyta Gold MD

## 2017-06-19 NOTE — PROGRESS NOTES
Verbal shift change report given to Tom Evangelista (oncoming nurse) by GURU Quinones (offgoing nurse). Report given with SBAR, Filiberto and MAR.

## 2017-06-19 NOTE — PROGRESS NOTES
6/19/2017 10:56 AM Met with pt. Charted address and phone number confirmed. Pt lives with her  and 3 children in [address] [place] [address]. Pt has no history of home health and does not own any DME. Pt was independent with adls and driving prior to admission. Pt has rx coverage and fills her scripts at [facility]. Pt's  will transport pt home. No discharge needs identified at this time. CM will follow. ANGELY Hathaway   Care Management Interventions  PCP Verified by CM: Yes (Ashley Puente )  Mode of Transport at Discharge:  Other (see comment) (Pt's )  Current Support Network: Lives with Spouse, Own Home  Confirm Follow Up Transport: Self  Discharge Location  Discharge Placement: Home with family assistance

## 2017-06-20 LAB
ANION GAP BLD CALC-SCNC: 14 MMOL/L (ref 5–15)
BASOPHILS # BLD AUTO: 0 K/UL (ref 0–0.1)
BASOPHILS # BLD: 0 % (ref 0–1)
BUN SERPL-MCNC: 6 MG/DL (ref 6–20)
BUN/CREAT SERPL: 9 (ref 12–20)
CALCIUM SERPL-MCNC: 8 MG/DL (ref 8.5–10.1)
CHLORIDE SERPL-SCNC: 109 MMOL/L (ref 97–108)
CO2 SERPL-SCNC: 18 MMOL/L (ref 21–32)
CREAT SERPL-MCNC: 0.68 MG/DL (ref 0.55–1.02)
EOSINOPHIL # BLD: 0.1 K/UL (ref 0–0.4)
EOSINOPHIL NFR BLD: 0 % (ref 0–7)
ERYTHROCYTE [DISTWIDTH] IN BLOOD BY AUTOMATED COUNT: 14 % (ref 11.5–14.5)
GLUCOSE SERPL-MCNC: 73 MG/DL (ref 65–100)
HCT VFR BLD AUTO: 36.4 % (ref 35–47)
HGB BLD-MCNC: 12.1 G/DL (ref 11.5–16)
LYMPHOCYTES # BLD AUTO: 13 % (ref 12–49)
LYMPHOCYTES # BLD: 2 K/UL (ref 0.8–3.5)
MCH RBC QN AUTO: 28.6 PG (ref 26–34)
MCHC RBC AUTO-ENTMCNC: 33.2 G/DL (ref 30–36.5)
MCV RBC AUTO: 86.1 FL (ref 80–99)
MONOCYTES # BLD: 1.1 K/UL (ref 0–1)
MONOCYTES NFR BLD AUTO: 7 % (ref 5–13)
NEUTS SEG # BLD: 11.8 K/UL (ref 1.8–8)
NEUTS SEG NFR BLD AUTO: 80 % (ref 32–75)
PLATELET # BLD AUTO: 230 K/UL (ref 150–400)
POTASSIUM SERPL-SCNC: 4.5 MMOL/L (ref 3.5–5.1)
RBC # BLD AUTO: 4.23 M/UL (ref 3.8–5.2)
SODIUM SERPL-SCNC: 141 MMOL/L (ref 136–145)
WBC # BLD AUTO: 15 K/UL (ref 3.6–11)

## 2017-06-20 PROCEDURE — 65270000029 HC RM PRIVATE

## 2017-06-20 PROCEDURE — 74011250637 HC RX REV CODE- 250/637: Performed by: INTERNAL MEDICINE

## 2017-06-20 PROCEDURE — 36415 COLL VENOUS BLD VENIPUNCTURE: CPT | Performed by: PHYSICIAN ASSISTANT

## 2017-06-20 PROCEDURE — 85025 COMPLETE CBC W/AUTO DIFF WBC: CPT | Performed by: PHYSICIAN ASSISTANT

## 2017-06-20 PROCEDURE — 74011000258 HC RX REV CODE- 258: Performed by: INTERNAL MEDICINE

## 2017-06-20 PROCEDURE — 74011250636 HC RX REV CODE- 250/636: Performed by: INTERNAL MEDICINE

## 2017-06-20 PROCEDURE — 80048 BASIC METABOLIC PNL TOTAL CA: CPT | Performed by: PHYSICIAN ASSISTANT

## 2017-06-20 RX ORDER — DEXTROSE MONOHYDRATE AND SODIUM CHLORIDE 5; .45 G/100ML; G/100ML
100 INJECTION, SOLUTION INTRAVENOUS CONTINUOUS
Status: DISCONTINUED | OUTPATIENT
Start: 2017-06-20 | End: 2017-06-22

## 2017-06-20 RX ORDER — ENOXAPARIN SODIUM 100 MG/ML
40 INJECTION SUBCUTANEOUS EVERY 24 HOURS
Status: DISCONTINUED | OUTPATIENT
Start: 2017-06-20 | End: 2017-06-23 | Stop reason: HOSPADM

## 2017-06-20 RX ADMIN — SODIUM CHLORIDE AND POTASSIUM CHLORIDE: 9; 2.98 INJECTION, SOLUTION INTRAVENOUS at 00:04

## 2017-06-20 RX ADMIN — DEXTROSE MONOHYDRATE AND SODIUM CHLORIDE 100 ML/HR: 5; .45 INJECTION, SOLUTION INTRAVENOUS at 19:15

## 2017-06-20 RX ADMIN — DEXTROSE MONOHYDRATE AND SODIUM CHLORIDE 100 ML/HR: 5; .45 INJECTION, SOLUTION INTRAVENOUS at 09:41

## 2017-06-20 RX ADMIN — ACETAMINOPHEN 650 MG: 325 TABLET ORAL at 03:23

## 2017-06-20 RX ADMIN — PIPERACILLIN SODIUM,TAZOBACTAM SODIUM 3.38 G: 3; .375 INJECTION, POWDER, FOR SOLUTION INTRAVENOUS at 09:43

## 2017-06-20 RX ADMIN — ACETAMINOPHEN 650 MG: 325 TABLET ORAL at 15:05

## 2017-06-20 RX ADMIN — PIPERACILLIN SODIUM,TAZOBACTAM SODIUM 3.38 G: 3; .375 INJECTION, POWDER, FOR SOLUTION INTRAVENOUS at 03:11

## 2017-06-20 RX ADMIN — Medication 10 ML: at 15:03

## 2017-06-20 RX ADMIN — ACETAMINOPHEN 650 MG: 325 TABLET ORAL at 10:15

## 2017-06-20 RX ADMIN — PIPERACILLIN SODIUM,TAZOBACTAM SODIUM 3.38 G: 3; .375 INJECTION, POWDER, FOR SOLUTION INTRAVENOUS at 17:15

## 2017-06-20 RX ADMIN — ENOXAPARIN SODIUM 40 MG: 40 INJECTION SUBCUTANEOUS at 19:15

## 2017-06-20 NOTE — PROGRESS NOTES
0300-PIV Assistance Requested from Primary RN. New # 20 G Started R Hand Without incidence. Labs Drawn and sent to lab reported off to Primary RN. Kristie Farfan RN  ICU Night Shift Resource Nurse

## 2017-06-20 NOTE — PROGRESS NOTES
Bedside and Verbal shift change report given to Jose Tirado RN (oncoming nurse) by Aldo Mccarthy RN (offgoing nurse). Report included the following information SBAR, Kardex, Procedure Summary, Intake/Output, MAR and Recent Results.

## 2017-06-20 NOTE — PROGRESS NOTES
Bedside and Verbal shift change report given to Irene Hairston (oncoming nurse) by [facility], RN (offgoing nurse). Report included the following information SBAR, Kardex, Intake/Output and MAR.

## 2017-06-20 NOTE — PROGRESS NOTES
General Surgery Daily Progress Note    Patient: Kusum Grey MRN: [id number]  SSN: [id number]    YOB: 1965  Age: 47 y.o. Sex: female      Admit Date: 6/18/2017    Subjective:   Pain improved. Had BM which was painful to pass. Denies N/V.  Afebrile overnight     Current Facility-Administered Medications   Medication Dose Route Frequency    dextrose 5 % - 0.45% NaCl infusion  100 mL/hr IntraVENous CONTINUOUS    sodium chloride (NS) flush 5-10 mL  5-10 mL IntraVENous Q8H    sodium chloride (NS) flush 5-10 mL  5-10 mL IntraVENous PRN    acetaminophen (TYLENOL) tablet 650 mg  650 mg Oral Q4H PRN    morphine injection 2 mg  2 mg IntraVENous Q4H PRN    naloxone (NARCAN) injection 0.4 mg  0.4 mg IntraVENous PRN    diphenhydrAMINE (BENADRYL) injection 12.5 mg  12.5 mg IntraVENous Q4H PRN    prochlorperazine (COMPAZINE) injection 5 mg  5 mg IntraVENous Q8H PRN    LORazepam (ATIVAN) tablet 1 mg  1 mg Oral BID PRN    piperacillin-tazobactam (ZOSYN) 3.375 g in 0.9% sodium chloride (MBP/ADV) 100 mL  3.375 g IntraVENous Q8H    0.9% sodium chloride with KCl 40 mEq/L infusion   IntraVENous CONTINUOUS        Objective:   06/20 0701 - 06/20 1900  In: 437.5 [I.V.:437.5]  Out: 550 [Urine:550]  06/18 1901 - 06/20 0700  In: 1218.8 [I.V.:1218.8]  Out: 750 [Urine:750]  Patient Vitals for the past 8 hrs:   BP Temp Pulse Resp SpO2   06/20/17 0832 125/75 98.2 °F (36.8 °C) 80 18 98 %   06/20/17 0339 107/66 98.5 °F (36.9 °C) 90 18 97 %       Physical Exam:  General: Alert, cooperative, NAD  Lungs: Unlabored  Heart:  Regular rate and rhythm  Abdomen: Soft, LLQ tenderness w/o rebound  Extremities: Warm, moves all, no edema  Skin:  Warm and dry, no rash    Labs:   Recent Labs      06/20/17   0138   WBC  15.0*   HGB  12.1   HCT  36.4   PLT  230     Recent Labs      06/20/17   0138  06/19/17   0454   NA  141  144   K  4.5  3.8   CL  109*  109*   CO2  18*  19*   GLU  73  80   BUN  6  10   CREA  0.68  0.91   CA  8.0*  8.2* ALB   --   2.8*   TBILI   --   1.0   SGOT   --   18   ALT   --   29       Assessment / Plan:   · Acute sigmoid diverticulitis w/ contained perforation  · Continue ABX and IVF  · LLQ on exam improved from yesterday  · White count creeping down   · CLD today but would not advance   · Ambulate in halls  · No indication for surgery

## 2017-06-20 NOTE — PROGRESS NOTES
Krystian Gordon [facility] [address], [place], [address] [facility]  [phone]      Medical Progress Note      NAME: Ruba Zamora   :  1965  MRM:  [id number]    Date/Time: 2017          Subjective:     Chief Complaint:  F/u diverticulitis    Chart/notes/labs/studies reviewed, patient examined at bedside. Feels a tad better. Less abdominal pain, but still tenderness on palpation. No f/c. No n/v/d. Objective:       Vitals:          Last 24hrs VS reviewed since prior progress note. Most recent are:    Visit Vitals    /78 (BP 1 Location: Right arm, BP Patient Position: At rest)    Pulse 76    Temp 98.1 °F (36.7 °C)    Resp 18    Ht 5' 9\" (1.753 m)    Wt 92.5 kg (204 lb)    SpO2 100%    BMI 30.13 kg/m2     SpO2 Readings from Last 6 Encounters:   17 100%          Intake/Output Summary (Last 24 hours) at 17 1729  Last data filed at 17 1715   Gross per 24 hour   Intake          1184.17 ml   Output             2100 ml   Net          -915.83 ml          Exam:     Physical Exam:    Gen:  Well-developed, well-nourished, in no acute distress  HEENT:  Sclerae nonicteric, hearing intact to voice, mucous membranes moist  Neck:  Supple, without masses. Resp:  No accessory muscle use, CTAB without wheezes, rales, or rhonchi  Card: RRR, without m/r/g. No LE edema. Abd:  +bowel sounds, soft, LLQ TTP, nondistended. Neuro: Face symmetric, tongue midline, speech fluent, follows commands appropriately  Psych:  Alert, oriented x 3.  Good insight     Medications Reviewed: (see below)    Lab Data Reviewed: (see below)    ______________________________________________________________________    Medications:     Current Facility-Administered Medications   Medication Dose Route Frequency    dextrose 5 % - 0.45% NaCl infusion  100 mL/hr IntraVENous CONTINUOUS    sodium chloride (NS) flush 5-10 mL  5-10 mL IntraVENous Q8H    sodium chloride (NS) flush 5-10 mL  5-10 mL IntraVENous PRN    acetaminophen (TYLENOL) tablet 650 mg  650 mg Oral Q4H PRN    morphine injection 2 mg  2 mg IntraVENous Q4H PRN    naloxone (NARCAN) injection 0.4 mg  0.4 mg IntraVENous PRN    diphenhydrAMINE (BENADRYL) injection 12.5 mg  12.5 mg IntraVENous Q4H PRN    prochlorperazine (COMPAZINE) injection 5 mg  5 mg IntraVENous Q8H PRN    LORazepam (ATIVAN) tablet 1 mg  1 mg Oral BID PRN    piperacillin-tazobactam (ZOSYN) 3.375 g in 0.9% sodium chloride (MBP/ADV) 100 mL  3.375 g IntraVENous Q8H            Lab Review:     Recent Labs      06/20/17 0138 06/19/17 0454   WBC  15.0*  16.1*   HGB  12.1  12.3   HCT  36.4  37.2   PLT  230  224     Recent Labs      06/20/17 0138 06/19/17 0454  06/18/17   1005   NA  141  144  138   K  4.5  3.8  3.5   CL  109*  109*  102   CO2  18*  19*  24   GLU  73  80  91   BUN  6  10  9   CREA  0.68  0.91  0.98   CA  8.0*  8.2*  8.6   ALB   --   2.8*   --    SGOT   --   18   --    ALT   --   29   --      No components found for: GLPOC  No results for input(s): PH, PCO2, PO2, HCO3, FIO2 in the last 72 hours. No results for input(s): INR in the last 72 hours. No lab exists for component: INREXT  No results found for: SDES  No results found for: CULT         Assessment / Plan:     48 yo WF w/ no significant PMH p/w abdominal pain, found to have acute diverticulitis      Diverticulitis of sigmoid colon / Abdominal pain, acute, left lower quadrant: CT a/p from 6/18 reviewed, showing inflammatory stranding in the deep pelvis adjacent to the sigmoid colon with colonic wall thickening and minimal loculated fluid in the deep pelvis. Findings are most consistent with sigmoid colonic diverticulitis.    Inflammatory changes in the right lower quadrant are less conspicuous  -- appears to be improving, started on clears. Gen surgery is following  -- cont pip/tazo      Metabolic acidosis: likely from sodium chloride IVF.  No gap  -- change NS to 1/2 NS  -- follow BMP      Leukocytosis: likely from acute diverticulitis.  Slightly improved  -- follow on Zosyn      Total time spent with patient: 30 minutes                 Care Plan discussed with: Patient, Nursing Staff and >50% of time spent in counseling and coordination of care    Discussed:  Care Plan    Prophylaxis:  Lovenox    Disposition:  Home w/Family           ___________________________________________________    Attending Physician: Wilver Gutierrez MD

## 2017-06-21 LAB
ANION GAP BLD CALC-SCNC: 12 MMOL/L (ref 5–15)
BASOPHILS # BLD AUTO: 0 K/UL (ref 0–0.1)
BASOPHILS # BLD: 0 % (ref 0–1)
BUN SERPL-MCNC: 2 MG/DL (ref 6–20)
BUN/CREAT SERPL: 3 (ref 12–20)
CALCIUM SERPL-MCNC: 8.6 MG/DL (ref 8.5–10.1)
CHLORIDE SERPL-SCNC: 109 MMOL/L (ref 97–108)
CO2 SERPL-SCNC: 23 MMOL/L (ref 21–32)
CREAT SERPL-MCNC: 0.71 MG/DL (ref 0.55–1.02)
EOSINOPHIL # BLD: 0.1 K/UL (ref 0–0.4)
EOSINOPHIL NFR BLD: 1 % (ref 0–7)
ERYTHROCYTE [DISTWIDTH] IN BLOOD BY AUTOMATED COUNT: 13.8 % (ref 11.5–14.5)
GLUCOSE SERPL-MCNC: 119 MG/DL (ref 65–100)
HCT VFR BLD AUTO: 37.4 % (ref 35–47)
HGB BLD-MCNC: 12.9 G/DL (ref 11.5–16)
LYMPHOCYTES # BLD AUTO: 16 % (ref 12–49)
LYMPHOCYTES # BLD: 1.6 K/UL (ref 0.8–3.5)
MAGNESIUM SERPL-MCNC: 2 MG/DL (ref 1.6–2.4)
MCH RBC QN AUTO: 28.8 PG (ref 26–34)
MCHC RBC AUTO-ENTMCNC: 34.5 G/DL (ref 30–36.5)
MCV RBC AUTO: 83.5 FL (ref 80–99)
MONOCYTES # BLD: 0.9 K/UL (ref 0–1)
MONOCYTES NFR BLD AUTO: 9 % (ref 5–13)
NEUTS SEG # BLD: 7.8 K/UL (ref 1.8–8)
NEUTS SEG NFR BLD AUTO: 74 % (ref 32–75)
PHOSPHATE SERPL-MCNC: 2.2 MG/DL (ref 2.6–4.7)
PLATELET # BLD AUTO: 249 K/UL (ref 150–400)
POTASSIUM SERPL-SCNC: 4 MMOL/L (ref 3.5–5.1)
RBC # BLD AUTO: 4.48 M/UL (ref 3.8–5.2)
SODIUM SERPL-SCNC: 144 MMOL/L (ref 136–145)
WBC # BLD AUTO: 10.5 K/UL (ref 3.6–11)

## 2017-06-21 PROCEDURE — 65270000029 HC RM PRIVATE

## 2017-06-21 PROCEDURE — 74011000258 HC RX REV CODE- 258: Performed by: INTERNAL MEDICINE

## 2017-06-21 PROCEDURE — 80048 BASIC METABOLIC PNL TOTAL CA: CPT | Performed by: INTERNAL MEDICINE

## 2017-06-21 PROCEDURE — 74011250637 HC RX REV CODE- 250/637: Performed by: INTERNAL MEDICINE

## 2017-06-21 PROCEDURE — 85025 COMPLETE CBC W/AUTO DIFF WBC: CPT | Performed by: INTERNAL MEDICINE

## 2017-06-21 PROCEDURE — 74011250636 HC RX REV CODE- 250/636: Performed by: INTERNAL MEDICINE

## 2017-06-21 PROCEDURE — 87493 C DIFF AMPLIFIED PROBE: CPT | Performed by: INTERNAL MEDICINE

## 2017-06-21 PROCEDURE — 83735 ASSAY OF MAGNESIUM: CPT | Performed by: INTERNAL MEDICINE

## 2017-06-21 PROCEDURE — 84100 ASSAY OF PHOSPHORUS: CPT | Performed by: INTERNAL MEDICINE

## 2017-06-21 PROCEDURE — 36415 COLL VENOUS BLD VENIPUNCTURE: CPT | Performed by: INTERNAL MEDICINE

## 2017-06-21 RX ORDER — SODIUM,POTASSIUM PHOSPHATES 280-250MG
2 POWDER IN PACKET (EA) ORAL ONCE
Status: COMPLETED | OUTPATIENT
Start: 2017-06-21 | End: 2017-06-21

## 2017-06-21 RX ADMIN — PIPERACILLIN SODIUM,TAZOBACTAM SODIUM 3.38 G: 3; .375 INJECTION, POWDER, FOR SOLUTION INTRAVENOUS at 17:10

## 2017-06-21 RX ADMIN — Medication 10 ML: at 06:00

## 2017-06-21 RX ADMIN — ACETAMINOPHEN 650 MG: 325 TABLET ORAL at 17:16

## 2017-06-21 RX ADMIN — DEXTROSE MONOHYDRATE AND SODIUM CHLORIDE 100 ML/HR: 5; .45 INJECTION, SOLUTION INTRAVENOUS at 08:27

## 2017-06-21 RX ADMIN — POTASSIUM & SODIUM PHOSPHATES POWDER PACK 280-160-250 MG 2 PACKET: 280-160-250 PACK at 08:28

## 2017-06-21 RX ADMIN — Medication 10 ML: at 21:16

## 2017-06-21 RX ADMIN — Medication 10 ML: at 13:21

## 2017-06-21 RX ADMIN — ACETAMINOPHEN 650 MG: 325 TABLET ORAL at 13:19

## 2017-06-21 RX ADMIN — DEXTROSE MONOHYDRATE AND SODIUM CHLORIDE 100 ML/HR: 5; .45 INJECTION, SOLUTION INTRAVENOUS at 18:09

## 2017-06-21 RX ADMIN — Medication 1 CAPSULE: at 13:19

## 2017-06-21 RX ADMIN — PIPERACILLIN SODIUM,TAZOBACTAM SODIUM 3.38 G: 3; .375 INJECTION, POWDER, FOR SOLUTION INTRAVENOUS at 02:30

## 2017-06-21 RX ADMIN — ENOXAPARIN SODIUM 40 MG: 40 INJECTION SUBCUTANEOUS at 17:11

## 2017-06-21 RX ADMIN — ACETAMINOPHEN 650 MG: 325 TABLET ORAL at 09:01

## 2017-06-21 RX ADMIN — PIPERACILLIN SODIUM,TAZOBACTAM SODIUM 3.38 G: 3; .375 INJECTION, POWDER, FOR SOLUTION INTRAVENOUS at 10:10

## 2017-06-21 RX ADMIN — ACETAMINOPHEN 650 MG: 325 TABLET ORAL at 21:16

## 2017-06-21 NOTE — MED STUDENT NOTES
General Surgery Daily Progress Note Patient: Robert Brizuela MRN: [id number]  SSN: [id number] YOB: 1965  Age: 47 y.o. Sex: female Admit Date: 6/18/2017 Subjective:  
Hospital  day #3 of sigmoid diverticulitis. Pt states she is having continous diarrhea with lower abdominal cramping. Able to tolerate liquids. Able to ambulate. Denies N/V, flatus, pain. 
  
 
Current Facility-Administered Medications Medication Dose Route Frequency  lactobac ac& pc-s.therm-b.anim (VANNA Q/RISAQUAD)  1 Cap Oral DAILY  dextrose 5 % - 0.45% NaCl infusion  100 mL/hr IntraVENous CONTINUOUS  
 enoxaparin (LOVENOX) injection 40 mg  40 mg SubCUTAneous Q24H  
 sodium chloride (NS) flush 5-10 mL  5-10 mL IntraVENous Q8H  
 sodium chloride (NS) flush 5-10 mL  5-10 mL IntraVENous PRN  
 acetaminophen (TYLENOL) tablet 650 mg  650 mg Oral Q4H PRN  
 morphine injection 2 mg  2 mg IntraVENous Q4H PRN  
 naloxone (NARCAN) injection 0.4 mg  0.4 mg IntraVENous PRN  
 diphenhydrAMINE (BENADRYL) injection 12.5 mg  12.5 mg IntraVENous Q4H PRN  prochlorperazine (COMPAZINE) injection 5 mg  5 mg IntraVENous Q8H PRN  
 LORazepam (ATIVAN) tablet 1 mg  1 mg Oral BID PRN  piperacillin-tazobactam (ZOSYN) 3.375 g in 0.9% sodium chloride (MBP/ADV) 100 mL  3.375 g IntraVENous Q8H Objective:  
06/21 0701 - 06/21 1900 In: -  
Out: [facility] 06/19 1901 - 06/21 0700 In: 2767.5 [I.V.:2767.5] Out: 2100 [Urine:2100] Patient Vitals for the past 8 hrs: 
 BP Temp Pulse Resp SpO2  
06/21/17 1153 127/84 98.6 °F (37 °C) 77 19 99 % 06/21/17 0744 110/75 98.7 °F (37.1 °C) 78 18 99 % Physical Exam: 
General: Alert, cooperative, NAD Lungs: Clear to auscultation bilaterally. Heart:  regular rate and regualr rhythm Abdomen: Soft, non tender, non distended. + bowel sounds. Extremities: Warm, moves all, no edema Skin:  Warm and dry, no rash Labs: Recent Labs  
   06/21/17 
 0559 WBC  10.5 HGB 12.9  
HCT  37.4 PLT  249 Recent Labs  
   06/21/17 
 0559   06/19/17 
 0454 NA  144   < >  144 K  4.0   < >  3.8 CL  109*   < >  109* CO2  23   < >  19* GLU  119*   < >  80 BUN  2*   < >  10 CREA  0.71   < >  0.91  
CA  8.6   < >  8.2* MG  2.0   --    --   
PHOS  2.2*   --    --   
ALB   --    --   2.8* TBILI   --    --   1.0 SGOT   --    --   18 ALT   --    --   29  
 < > = values in this interval not displayed. Assessment / Plan:  
Assessment: Stable Hospital day #3 Diet:full liquid Fluids: dextrose Meds:continue enoxaparin, lactobac, zosyn. Continue prn acetaminophen Disposition: continue hospital admission

## 2017-06-21 NOTE — PROGRESS NOTES
Krystian Gordon dion [place] 80  [address] [facility], [address] [place] [address]  [phone]      Medical Progress Note      NAME: Gómez Brewer   :  1965  MRM:  [id number]    Date/Time: 2017          Subjective:     Chief Complaint:  F/u diverticulitis    Chart/notes/labs/studies reviewed, patient examined at bedside. Lots of diarrhea. No significant pain. No f/c. Objective:       Vitals:          Last 24hrs VS reviewed since prior progress note. Most recent are:    Visit Vitals    /84 (BP 1 Location: Right arm, BP Patient Position: At rest)    Pulse 77    Temp 98.6 °F (37 °C)    Resp 19    Ht 5' 9\" (1.753 m)    Wt 92.5 kg (204 lb)    SpO2 99%    BMI 30.13 kg/m2     SpO2 Readings from Last 6 Encounters:   17 99%            Intake/Output Summary (Last 24 hours) at 17 1420  Last data filed at 17 1322   Gross per 24 hour   Intake          2051.67 ml   Output             1050 ml   Net          1001.67 ml          Exam:     Physical Exam:    Gen:  Well-developed, well-nourished, in no acute distress. Pleasant   HEENT:  Sclerae nonicteric, hearing intact to voice, mucous membranes moist  Neck:  Supple, without masses. Resp:  No accessory muscle use, CTAB without wheezes, rales, or rhonchi  Card: RRR, without m/r/g. No LE edema. Abd:  +bowel sounds, soft, LLQ TTP, nondistended. Neuro: Face symmetric, tongue midline, speech fluent, follows commands appropriately  Psych:  Alert, oriented x 3.  Good insight     Medications Reviewed: (see below)    Lab Data Reviewed: (see below)    ______________________________________________________________________    Medications:     Current Facility-Administered Medications   Medication Dose Route Frequency    lactobac ac& pc-s.therm-b.anim (VANNA Q/RISAQUAD)  1 Cap Oral DAILY    dextrose 5 % - 0.45% NaCl infusion  100 mL/hr IntraVENous CONTINUOUS    enoxaparin (LOVENOX) injection 40 mg  40 mg SubCUTAneous Q24H  sodium chloride (NS) flush 5-10 mL  5-10 mL IntraVENous Q8H    sodium chloride (NS) flush 5-10 mL  5-10 mL IntraVENous PRN    acetaminophen (TYLENOL) tablet 650 mg  650 mg Oral Q4H PRN    morphine injection 2 mg  2 mg IntraVENous Q4H PRN    naloxone (NARCAN) injection 0.4 mg  0.4 mg IntraVENous PRN    diphenhydrAMINE (BENADRYL) injection 12.5 mg  12.5 mg IntraVENous Q4H PRN    prochlorperazine (COMPAZINE) injection 5 mg  5 mg IntraVENous Q8H PRN    LORazepam (ATIVAN) tablet 1 mg  1 mg Oral BID PRN    piperacillin-tazobactam (ZOSYN) 3.375 g in 0.9% sodium chloride (MBP/ADV) 100 mL  3.375 g IntraVENous Q8H            Lab Review:     Recent Labs      06/21/17   0559  06/20/17   0138  06/19/17   0454   WBC  10.5  15.0*  16.1*   HGB  12.9  12.1  12.3   HCT  37.4  36.4  37.2   PLT  249  230  224     Recent Labs      06/21/17   0559  06/20/17   0138  06/19/17   0454   NA  144  141  144   K  4.0  4.5  3.8   CL  109*  109*  109*   CO2  23  18*  19*   GLU  119*  73  80   BUN  2*  6  10   CREA  0.71  0.68  0.91   CA  8.6  8.0*  8.2*   MG  2.0   --    --    PHOS  2.2*   --    --    ALB   --    --   2.8*   SGOT   --    --   18   ALT   --    --   29     No components found for: GLPOC  No results for input(s): PH, PCO2, PO2, HCO3, FIO2 in the last 72 hours. No results for input(s): INR in the last 72 hours. No lab exists for component: INREXT, INREXT  No results found for: SDES  No results found for: CULT         Assessment / Plan:     48 yo WF w/ no significant PMH p/w abdominal pain, found to have acute diverticulitis      Diverticulitis of sigmoid colon / Abdominal pain, acute, left lower quadrant: CT a/p from 6/18 reviewed, showing inflammatory stranding in the deep pelvis adjacent to the sigmoid colon with colonic wall thickening and minimal loculated fluid in the deep pelvis. Findings are most consistent with sigmoid colonic diverticulitis.  Inflammatory changes in the right lower quadrant are less conspicuous  -- advance diet to full liquid. Gen surgery on board  -- cont pip/tazo      Diarrhea: may be from the above, or from abx  -- rule out C. Diff  -- start pro-biotic      Metabolic acidosis: likely from sodium chloride IVF. No gap  -- change NS to 1/2 NS  -- follow BMP      Leukocytosis: likely from acute diverticulitis.  Improved  -- follow on Zosyn      Total time spent with patient: 25 minutes                 Care Plan discussed with: Patient, Nursing Staff and >50% of time spent in counseling and coordination of care    Discussed:  Care Plan    Prophylaxis:  Lovenox    Disposition:  Home w/Family           ___________________________________________________    Attending Physician: Sylvie Perea MD

## 2017-06-21 NOTE — PROGRESS NOTES
General Surgery Daily Progress Note    Patient: Petey Garcia MRN: [id number]  SSN: [id number]    YOB: 1965  Age: 47 y.o. Sex: female      Admit Date: 6/18/2017    Subjective:   Pt has had multiple episodes of diarrhea associated with cramping but otherwise LLQ pain is improved. Denies N/V. Tolerating clear liquids.      Current Facility-Administered Medications   Medication Dose Route Frequency    dextrose 5 % - 0.45% NaCl infusion  100 mL/hr IntraVENous CONTINUOUS    enoxaparin (LOVENOX) injection 40 mg  40 mg SubCUTAneous Q24H    sodium chloride (NS) flush 5-10 mL  5-10 mL IntraVENous Q8H    sodium chloride (NS) flush 5-10 mL  5-10 mL IntraVENous PRN    acetaminophen (TYLENOL) tablet 650 mg  650 mg Oral Q4H PRN    morphine injection 2 mg  2 mg IntraVENous Q4H PRN    naloxone (NARCAN) injection 0.4 mg  0.4 mg IntraVENous PRN    diphenhydrAMINE (BENADRYL) injection 12.5 mg  12.5 mg IntraVENous Q4H PRN    prochlorperazine (COMPAZINE) injection 5 mg  5 mg IntraVENous Q8H PRN    LORazepam (ATIVAN) tablet 1 mg  1 mg Oral BID PRN    piperacillin-tazobactam (ZOSYN) 3.375 g in 0.9% sodium chloride (MBP/ADV) 100 mL  3.375 g IntraVENous Q8H        Objective:   06/21 0701 - 06/21 1900  In: -   Out: 300 [Urine:300]  06/19 1901 - 06/21 0700  In: 2767.5 [I.V.:2767.5]  Out: 2100 [Urine:2100]  Patient Vitals for the past 8 hrs:   BP Temp Pulse Resp SpO2   06/21/17 0744 110/75 98.7 °F (37.1 °C) 78 18 99 %       Physical Exam:  General: Alert, cooperative, NAD  Lungs: Unlabored  Heart:  Regular rate and rhythm  Abdomen: Soft, LLQ tenderness w/o rebound  Extremities: Warm, moves all, no edema  Skin:  Warm and dry, no rash    Labs:   Recent Labs      06/21/17   0559   WBC  10.5   HGB  12.9   HCT  37.4   PLT  249     Recent Labs      06/21/17   0559   06/19/17   0454   NA  144   < >  144   K  4.0   < >  3.8   CL  109*   < >  109*   CO2  23   < >  19*   GLU  119*   < >  80   BUN  2*   < >  10   CREA  0.71 < >  0.91   CA  8.6   < >  8.2*   MG  2.0   --    --    PHOS  2.2*   --    --    ALB   --    --   2.8*   TBILI   --    --   1.0   SGOT   --    --   18   ALT   --    --   29    < > = values in this interval not displayed.        Assessment / Plan:   · Acute sigmoid diverticulitis w/ contained perforation  · Continue ABX and IVF  · LLQ tenderness continues to improve  · Leukocytosis resolved, afebrile   · Advance to full liquids   · Ambulate in halls  · No indication for surgery

## 2017-06-21 NOTE — PROGRESS NOTES
Bedside and Verbal shift change report given to Luis Manuel Pollack (oncoming nurse) by Amadou Cook (offgoing nurse). Report included the following information SBAR, Kardex, Intake/Output, MAR and Recent Results.

## 2017-06-21 NOTE — PROGRESS NOTES
Bedside and Verbal shift change report given to [facility] RN (oncoming nurse) by George Lloyd RN (offgoing nurse). Report included the following information SBAR, Kardex, Procedure Summary, Intake/Output, MAR and Recent Results.

## 2017-06-22 LAB
ANION GAP BLD CALC-SCNC: 9 MMOL/L (ref 5–15)
BASOPHILS # BLD AUTO: 0 K/UL (ref 0–0.1)
BASOPHILS # BLD: 0 % (ref 0–1)
BUN SERPL-MCNC: 2 MG/DL (ref 6–20)
BUN/CREAT SERPL: 3 (ref 12–20)
C DIFF TOX GENS STL QL NAA+PROBE: NEGATIVE
CALCIUM SERPL-MCNC: 8.6 MG/DL (ref 8.5–10.1)
CHLORIDE SERPL-SCNC: 109 MMOL/L (ref 97–108)
CO2 SERPL-SCNC: 27 MMOL/L (ref 21–32)
CREAT SERPL-MCNC: 0.77 MG/DL (ref 0.55–1.02)
EOSINOPHIL # BLD: 0.1 K/UL (ref 0–0.4)
EOSINOPHIL NFR BLD: 1 % (ref 0–7)
ERYTHROCYTE [DISTWIDTH] IN BLOOD BY AUTOMATED COUNT: 14 % (ref 11.5–14.5)
GLUCOSE SERPL-MCNC: 128 MG/DL (ref 65–100)
HCT VFR BLD AUTO: 35.8 % (ref 35–47)
HGB BLD-MCNC: 11.9 G/DL (ref 11.5–16)
LYMPHOCYTES # BLD AUTO: 26 % (ref 12–49)
LYMPHOCYTES # BLD: 2.4 K/UL (ref 0.8–3.5)
MAGNESIUM SERPL-MCNC: 1.9 MG/DL (ref 1.6–2.4)
MCH RBC QN AUTO: 28.1 PG (ref 26–34)
MCHC RBC AUTO-ENTMCNC: 33.2 G/DL (ref 30–36.5)
MCV RBC AUTO: 84.4 FL (ref 80–99)
MONOCYTES # BLD: 0.8 K/UL (ref 0–1)
MONOCYTES NFR BLD AUTO: 9 % (ref 5–13)
NEUTS SEG # BLD: 5.9 K/UL (ref 1.8–8)
NEUTS SEG NFR BLD AUTO: 64 % (ref 32–75)
PHOSPHATE SERPL-MCNC: 3.2 MG/DL (ref 2.6–4.7)
PLATELET # BLD AUTO: 262 K/UL (ref 150–400)
POTASSIUM SERPL-SCNC: 3.4 MMOL/L (ref 3.5–5.1)
RBC # BLD AUTO: 4.24 M/UL (ref 3.8–5.2)
SODIUM SERPL-SCNC: 145 MMOL/L (ref 136–145)
WBC # BLD AUTO: 9.2 K/UL (ref 3.6–11)

## 2017-06-22 PROCEDURE — 83735 ASSAY OF MAGNESIUM: CPT | Performed by: INTERNAL MEDICINE

## 2017-06-22 PROCEDURE — 74011250636 HC RX REV CODE- 250/636: Performed by: INTERNAL MEDICINE

## 2017-06-22 PROCEDURE — 74011000258 HC RX REV CODE- 258: Performed by: INTERNAL MEDICINE

## 2017-06-22 PROCEDURE — 84100 ASSAY OF PHOSPHORUS: CPT | Performed by: INTERNAL MEDICINE

## 2017-06-22 PROCEDURE — 65270000029 HC RM PRIVATE

## 2017-06-22 PROCEDURE — 74011250637 HC RX REV CODE- 250/637: Performed by: INTERNAL MEDICINE

## 2017-06-22 PROCEDURE — 80048 BASIC METABOLIC PNL TOTAL CA: CPT | Performed by: INTERNAL MEDICINE

## 2017-06-22 PROCEDURE — 85025 COMPLETE CBC W/AUTO DIFF WBC: CPT | Performed by: INTERNAL MEDICINE

## 2017-06-22 PROCEDURE — 36415 COLL VENOUS BLD VENIPUNCTURE: CPT | Performed by: INTERNAL MEDICINE

## 2017-06-22 RX ORDER — POTASSIUM CHLORIDE 750 MG/1
40 TABLET, FILM COATED, EXTENDED RELEASE ORAL
Status: COMPLETED | OUTPATIENT
Start: 2017-06-22 | End: 2017-06-22

## 2017-06-22 RX ADMIN — PIPERACILLIN SODIUM,TAZOBACTAM SODIUM 3.38 G: 3; .375 INJECTION, POWDER, FOR SOLUTION INTRAVENOUS at 17:24

## 2017-06-22 RX ADMIN — ACETAMINOPHEN 650 MG: 325 TABLET ORAL at 07:59

## 2017-06-22 RX ADMIN — PIPERACILLIN SODIUM,TAZOBACTAM SODIUM 3.38 G: 3; .375 INJECTION, POWDER, FOR SOLUTION INTRAVENOUS at 11:19

## 2017-06-22 RX ADMIN — PIPERACILLIN SODIUM,TAZOBACTAM SODIUM 3.38 G: 3; .375 INJECTION, POWDER, FOR SOLUTION INTRAVENOUS at 03:23

## 2017-06-22 RX ADMIN — Medication 10 ML: at 21:29

## 2017-06-22 RX ADMIN — Medication 1 CAPSULE: at 09:28

## 2017-06-22 RX ADMIN — ENOXAPARIN SODIUM 40 MG: 40 INJECTION SUBCUTANEOUS at 17:24

## 2017-06-22 RX ADMIN — DEXTROSE MONOHYDRATE AND SODIUM CHLORIDE 100 ML/HR: 5; .45 INJECTION, SOLUTION INTRAVENOUS at 06:00

## 2017-06-22 RX ADMIN — POTASSIUM CHLORIDE 40 MEQ: 750 TABLET, FILM COATED, EXTENDED RELEASE ORAL at 09:27

## 2017-06-22 RX ADMIN — Medication 10 ML: at 14:14

## 2017-06-22 NOTE — PROGRESS NOTES
Received bedside report from [facility], RN. Patient A&O x4, resting in with family at bedside. No s/sxs of distress noted and no complaints of pain reported at this time. PIV intact; Zosyn currently infusing. No requests at this time; will continue to monitor.

## 2017-06-22 NOTE — PROGRESS NOTES
General Surgery Daily Progress Note    Patient: Sage Zamorano MRN: [id number]  SSN: [id number]    YOB: 1965  Age: 47 y.o. Sex: female      Admit Date: 6/18/2017    Subjective:   Feeling better. Having mild cramping with BMs but otherwise is pain free. Diarrhea less frequent. Tolerating fulls.      Current Facility-Administered Medications   Medication Dose Route Frequency    lactobac ac& pc-s.therm-b.anim (VANNA Q/RISAQUAD)  1 Cap Oral DAILY    dextrose 5 % - 0.45% NaCl infusion  100 mL/hr IntraVENous CONTINUOUS    enoxaparin (LOVENOX) injection 40 mg  40 mg SubCUTAneous Q24H    sodium chloride (NS) flush 5-10 mL  5-10 mL IntraVENous Q8H    sodium chloride (NS) flush 5-10 mL  5-10 mL IntraVENous PRN    acetaminophen (TYLENOL) tablet 650 mg  650 mg Oral Q4H PRN    morphine injection 2 mg  2 mg IntraVENous Q4H PRN    naloxone (NARCAN) injection 0.4 mg  0.4 mg IntraVENous PRN    diphenhydrAMINE (BENADRYL) injection 12.5 mg  12.5 mg IntraVENous Q4H PRN    prochlorperazine (COMPAZINE) injection 5 mg  5 mg IntraVENous Q8H PRN    LORazepam (ATIVAN) tablet 1 mg  1 mg Oral BID PRN    piperacillin-tazobactam (ZOSYN) 3.375 g in 0.9% sodium chloride (MBP/ADV) 100 mL  3.375 g IntraVENous Q8H        Objective:   06/22 0701 - 06/22 1900  In: 70 [I.V.:70]  Out: -   06/20 1901 - 06/22 0700  In: 2935 [I.V.:2935]  Out: 650 [Urine:650]  Patient Vitals for the past 8 hrs:   BP Temp Pulse Resp SpO2   06/22/17 0850 114/72 98.2 °F (36.8 °C) 69 17 98 %   06/22/17 0333 104/66 98 °F (36.7 °C) 75 18 99 %       Physical Exam:  General: Alert, cooperative, NAD  Lungs: Unlabored  Heart:  Regular rate and rhythm  Abdomen: Soft, non-tender  Extremities: Warm, moves all, no edema  Skin:  Warm and dry, no rash    Labs:   Recent Labs      06/22/17 0128   WBC  9.2   HGB  11.9   HCT  35.8   PLT  262     Recent Labs      06/22/17 0128   NA  145   K  3.4*   CL  109*   CO2  27   GLU  128*   BUN  2*   CREA  0.77   CA  8.6 MG  1.9   PHOS  3.2       Assessment / Plan:   · Acute sigmoid diverticulitis w/ contained perforation  · Continue ABX   · Tenderness resolved  · Diarrhea improved, c diff pending  · Advance to low residue diet  · Ambulate in halls  · Anticipate she may be ready for discharge tomorrow. Will need outpatient colonoscopy.

## 2017-06-22 NOTE — PROGRESS NOTES
Krystian Gordon [facility]  [address] [facility], [address] [place] [address]  [phone]      Medical Progress Note      NAME: Areli Morgan   :  1965  MRM:  [id number]    Date/Time: 2017          Subjective:     Chief Complaint:  F/u diverticulitis    Chart/notes/labs/studies reviewed, patient examined at bedside. Diarrhea improved. No significant abd pain. No f/c. Objective:       Vitals:          Last 24hrs VS reviewed since prior progress note. Most recent are:    Visit Vitals    /76 (BP 1 Location: Right arm, BP Patient Position: At rest)    Pulse 77    Temp 98.5 °F (36.9 °C)    Resp 17    Ht 5' 9\" (1.753 m)    Wt 92.5 kg (204 lb)    SpO2 98%    BMI 30.13 kg/m2     SpO2 Readings from Last 6 Encounters:   17 98%            Intake/Output Summary (Last 24 hours) at 17 1506  Last data filed at 17 1417   Gross per 24 hour   Intake          1381.66 ml   Output              600 ml   Net           781.66 ml          Exam:     Physical Exam:    Gen:  Well-developed, well-nourished, in no acute distress. Pleasant   HEENT:  Sclerae nonicteric, hearing intact to voice, mucous membranes moist  Neck:  Supple, without masses. Resp:  No accessory muscle use, CTAB without wheezes, rales, or rhonchi  Card: RRR, without m/r/g. No LE edema. Abd:  +bowel sounds, soft, minimal LLQ TTP, nondistended. Neuro: Face symmetric, tongue midline, speech fluent, follows commands appropriately  Psych:  Alert, oriented x 3.  Good insight     Medications Reviewed: (see below)    Lab Data Reviewed: (see below)    ______________________________________________________________________    Medications:     Current Facility-Administered Medications   Medication Dose Route Frequency    lactobac ac& pc-s.therm-b.anim (VANNA Q/RISAQUAD)  1 Cap Oral DAILY    dextrose 5 % - 0.45% NaCl infusion  100 mL/hr IntraVENous CONTINUOUS    enoxaparin (LOVENOX) injection 40 mg  40 mg SubCUTAneous Q24H    sodium chloride (NS) flush 5-10 mL  5-10 mL IntraVENous Q8H    sodium chloride (NS) flush 5-10 mL  5-10 mL IntraVENous PRN    acetaminophen (TYLENOL) tablet 650 mg  650 mg Oral Q4H PRN    morphine injection 2 mg  2 mg IntraVENous Q4H PRN    naloxone (NARCAN) injection 0.4 mg  0.4 mg IntraVENous PRN    diphenhydrAMINE (BENADRYL) injection 12.5 mg  12.5 mg IntraVENous Q4H PRN    prochlorperazine (COMPAZINE) injection 5 mg  5 mg IntraVENous Q8H PRN    LORazepam (ATIVAN) tablet 1 mg  1 mg Oral BID PRN    piperacillin-tazobactam (ZOSYN) 3.375 g in 0.9% sodium chloride (MBP/ADV) 100 mL  3.375 g IntraVENous Q8H            Lab Review:     Recent Labs      06/22/17   0128  06/21/17   0559  06/20/17   0138   WBC  9.2  10.5  15.0*   HGB  11.9  12.9  12.1   HCT  35.8  37.4  36.4   PLT  262  249  230     Recent Labs      06/22/17   0128  06/21/17   0559  06/20/17   0138   NA  145  144  141   K  3.4*  4.0  4.5   CL  109*  109*  109*   CO2  27  23  18*   GLU  128*  119*  73   BUN  2*  2*  6   CREA  0.77  0.71  0.68   CA  8.6  8.6  8.0*   MG  1.9  2.0   --    PHOS  3.2  2.2*   --      No components found for: GLPOC  No results for input(s): PH, PCO2, PO2, HCO3, FIO2 in the last 72 hours. No results for input(s): INR in the last 72 hours. No lab exists for component: INREXT, INREXT  No results found for: SDES  No results found for: CULT         Assessment / Plan:     48 yo WF w/ no significant PMH p/w abdominal pain, found to have acute diverticulitis      Diverticulitis of sigmoid colon / Abdominal pain, acute, left lower quadrant: CT a/p from 6/18 reviewed, showing inflammatory stranding in the deep pelvis adjacent to the sigmoid colon with colonic wall thickening and minimal loculated fluid in the deep pelvis. Findings are most consistent with sigmoid colonic diverticulitis. Inflammatory changes in the right lower quadrant are less conspicuous. ADAT to regular diet.  Anticipate d/c tomorrow on PO abx for 10-14 days total. On pip/tazo. Will need colonoscopy in 4-6 weeks after acute illness to r/o colon CA. Discussed with pt      Diarrhea: may be from the above, or from abx. Better. C. Diff ruled out. Cont pro-biotic      Metabolic acidosis: likely from sodium chloride IVF. No gap. Resolved. Stop IVF      Leukocytosis: likely from acute diverticulitis. Resolved.       Total time spent with patient: 25 minutes                 Care Plan discussed with: Patient, Nursing Staff and >50% of time spent in counseling and coordination of care    Discussed:  Care Plan    Prophylaxis:  Lovenox    Disposition:  Home w/Family           ___________________________________________________    Attending Physician: Ele Miles MD

## 2017-06-22 NOTE — PROGRESS NOTES
Bedside and Verbal shift change report given to [facility], RN (oncoming nurse) by Glenys Thomas RN (offgoing nurse). Report included the following information SBAR, Kardex, Procedure Summary, Intake/Output, MAR and Recent Results.

## 2017-06-22 NOTE — PROGRESS NOTES
NUTRITION   RD Rescreen      Diet: Regular, Low Fiber  Body mass index is 30.13 kg/(m^2). Skin: Intact  PO Intake:   No data found. b'fast: ate 80%    Estimated Daily Nutrition Requirements:  Kcals:  3759-7565 (20-23 kcals/kg x actual BW)                Protein: 74-92 (0.8-1.0 g/kg x actual BW)                Fluid: 2L/d (~22mL/kg of current wt)      6/22: visited with pt. Diet advanced this morning & well tolerated. Pt has good understanding of diet recommendations. Reports no c/o of abd pain after meal, less BMs today (small amount x2 so far) awaiting results from c.diff. No nutrition-related changes needed at this time. 6/19: Consult received for diet education. MST referral received for EN/TPN PTA.  47 yof admitted for acute diverticulitis. Surgery following - no surgery plan at this time. IV abx and fluids. Currently NPO w/ plans to wait for subsided pain p/t diet advancement. Visited pt this afternoon. Reports improvement in pain. Denied EN/TPN PTA. Baseline appetite is good. Obese per BMI. Provided Low Fiber diet education for diverticulitis symptoms. Follow until pain and inflammation subsides, usually ~3 wks. Provided maintenance diet education - High Fiber. Recommendations and tips for re-introducing fiber back in to diet. Pt receptive and agreeable to diet information. Will continue to monitor plan of care, diet advancement, PO. Nutrition Diagnosis: Alteration in GI function related to alteration in GI tract structure/function as evidenced by diverticulitis dx and Diarrhea  Nutrition Intervention: General, healthful diet; nutrition education  Goal: Advanced diet w/ no s/s of intolerance in the next 1-3 days - MET  Monitoring and Evaluation: diet advancement, PO     RD will continue to monitor and will f/u for further nutrition assessment and intervention as appropriate.     Education & Discharge Needs:   [x] Nutrition related discharge needs addressed:    [] Supplements (on d/c instruction &/or coupons provided)  [x] Education    []No nutrition related discharge needs at this time     Cultural, Scientology and ethnic food preferences identified    [x]None   [] Yes     Nicolle Benoit, RD, MS, CDE

## 2017-06-23 VITALS
HEIGHT: 69 IN | WEIGHT: 204 LBS | RESPIRATION RATE: 18 BRPM | TEMPERATURE: 98.2 F | HEART RATE: 76 BPM | DIASTOLIC BLOOD PRESSURE: 75 MMHG | OXYGEN SATURATION: 98 % | BODY MASS INDEX: 30.21 KG/M2 | SYSTOLIC BLOOD PRESSURE: 119 MMHG

## 2017-06-23 LAB
ANION GAP BLD CALC-SCNC: 8 MMOL/L (ref 5–15)
BUN SERPL-MCNC: 4 MG/DL (ref 6–20)
BUN/CREAT SERPL: 5 (ref 12–20)
CALCIUM SERPL-MCNC: 9.2 MG/DL (ref 8.5–10.1)
CHLORIDE SERPL-SCNC: 107 MMOL/L (ref 97–108)
CO2 SERPL-SCNC: 26 MMOL/L (ref 21–32)
CREAT SERPL-MCNC: 0.87 MG/DL (ref 0.55–1.02)
ERYTHROCYTE [DISTWIDTH] IN BLOOD BY AUTOMATED COUNT: 14.1 % (ref 11.5–14.5)
GLUCOSE SERPL-MCNC: 89 MG/DL (ref 65–100)
HCT VFR BLD AUTO: 38.5 % (ref 35–47)
HGB BLD-MCNC: 12.9 G/DL (ref 11.5–16)
MCH RBC QN AUTO: 28.2 PG (ref 26–34)
MCHC RBC AUTO-ENTMCNC: 33.5 G/DL (ref 30–36.5)
MCV RBC AUTO: 84.1 FL (ref 80–99)
PLATELET # BLD AUTO: 270 K/UL (ref 150–400)
POTASSIUM SERPL-SCNC: 3.6 MMOL/L (ref 3.5–5.1)
RBC # BLD AUTO: 4.58 M/UL (ref 3.8–5.2)
SODIUM SERPL-SCNC: 141 MMOL/L (ref 136–145)
WBC # BLD AUTO: 8 K/UL (ref 3.6–11)

## 2017-06-23 PROCEDURE — 74011250637 HC RX REV CODE- 250/637: Performed by: INTERNAL MEDICINE

## 2017-06-23 PROCEDURE — 80048 BASIC METABOLIC PNL TOTAL CA: CPT | Performed by: INTERNAL MEDICINE

## 2017-06-23 PROCEDURE — 74011000258 HC RX REV CODE- 258: Performed by: INTERNAL MEDICINE

## 2017-06-23 PROCEDURE — 36415 COLL VENOUS BLD VENIPUNCTURE: CPT | Performed by: INTERNAL MEDICINE

## 2017-06-23 PROCEDURE — 74011250636 HC RX REV CODE- 250/636: Performed by: INTERNAL MEDICINE

## 2017-06-23 PROCEDURE — 85027 COMPLETE CBC AUTOMATED: CPT | Performed by: INTERNAL MEDICINE

## 2017-06-23 RX ORDER — AMOXICILLIN AND CLAVULANATE POTASSIUM 875; 125 MG/1; MG/1
1 TABLET, FILM COATED ORAL EVERY 12 HOURS
Qty: 4 TAB | Refills: 0 | Status: SHIPPED | OUTPATIENT
Start: 2017-06-23 | End: 2017-06-25

## 2017-06-23 RX ORDER — AMOXICILLIN AND CLAVULANATE POTASSIUM 875; 125 MG/1; MG/1
1 TABLET, FILM COATED ORAL EVERY 12 HOURS
Status: DISCONTINUED | OUTPATIENT
Start: 2017-06-23 | End: 2017-06-23 | Stop reason: HOSPADM

## 2017-06-23 RX ADMIN — ACETAMINOPHEN 650 MG: 325 TABLET ORAL at 07:38

## 2017-06-23 RX ADMIN — AMOXICILLIN AND CLAVULANATE POTASSIUM 1 TABLET: 875; 125 TABLET, FILM COATED ORAL at 08:38

## 2017-06-23 RX ADMIN — PIPERACILLIN SODIUM,TAZOBACTAM SODIUM 3.38 G: 3; .375 INJECTION, POWDER, FOR SOLUTION INTRAVENOUS at 04:02

## 2017-06-23 RX ADMIN — Medication 10 ML: at 04:02

## 2017-06-23 RX ADMIN — Medication 1 CAPSULE: at 08:38

## 2017-06-23 NOTE — DISCHARGE INSTRUCTIONS
Patient Discharge Instructions    Nancy Damon / [id number] : 1965    Admitted 2017 Discharged: 2017     Primary Diagnoses  Problem List as of 2017  Never Reviewed          Codes Class Noted - Resolved   * (Principal)Diverticulitis of sigmoid colon   Abdominal pain, acute, left lower quadrant   Leukocytosis          Take Home Medications     · It is important that you take the medication exactly as they are prescribed. · Keep your medication in the bottles provided by the pharmacist and keep a list of the medication names, dosages, and times to be taken in your wallet. · Do not take other medications without consulting your doctor. What to do at Home    Recommended diet: Increase noncaffeinated fluids and bland for 2 weeks    Recommended activity: Activity as tolerated    If you experience worse pain or other symptoms, please follow up with GI or go to ER. Follow-up with your PCP in a few weeks, and GI in 6 weeks     Diverticulitis: Care Instructions  Your Care Instructions    Diverticulitis occurs when pouches form in the wall of the colon and become inflamed or infected. It can be very painful. Doctors aren't sure what causes diverticulitis. There is no proof that foods such as nuts, seeds, or berries cause it or make it worse. A low-fiber diet may cause the colon to work harder to push stool forward. Pouches may form because of this extra work. It may be hard to think about healthy eating while you're in pain. But as you recover, you might think about how you can use healthy eating for overall better health. Healthy eating may help you avoid future attacks. Follow-up care is a key part of your treatment and safety. Be sure to make and go to all appointments, and call your doctor if you are having problems. It's also a good idea to know your test results and keep a list of the medicines you take. How can you care for yourself at home?   · Drink plenty of fluids, enough so that your urine is light yellow or clear like water. If you have kidney, heart, or liver disease and have to limit fluids, talk with your doctor before you increase the amount of fluids you drink. · Stick to liquids or a bland diet (plain rice, bananas, dry toast or crackers, applesauce) until you are feeling better. Then you can return to regular foods and gradually increase the amount of fiber in your diet. · Use a heating pad set on low on your belly to relieve mild cramps and pain. · Get extra rest until you are feeling better. · Be safe with medicines. Read and follow all instructions on the label. ¨ If the doctor gave you a prescription medicine for pain, take it as prescribed. ¨ If you are not taking a prescription pain medicine, ask your doctor if you can take an over-the-counter medicine. · If your doctor prescribed antibiotics, take them as directed. Do not stop taking them just because you feel better. You need to take the full course of antibiotics. To prevent future attacks of diverticulitis  · Avoid constipation:  ¨ Include fruits, vegetables, beans, and whole grains in your diet each day. These foods are high in fiber. ¨ Drink plenty of fluids, enough so that your urine is light yellow or clear like water. If you have kidney, heart, or liver disease and have to limit fluids, talk with your doctor before you increase the amount of fluids you drink. ¨ Get some exercise every day. Build up slowly to 30 to 60 minutes a day on 5 or more days of the week. ¨ Take a fiber supplement, such as Citrucel or Metamucil, every day if needed. Read and follow all instructions on the label. ¨ Schedule time each day for a bowel movement. Having a daily routine may help. Take your time and do not strain when having a bowel movement. When should you call for help? Call 911 anytime you think you may need emergency care. For example, call if:  · You passed out (lost consciousness).   · You vomit blood or what looks like coffee grounds. · You pass maroon or very bloody stools. Call your doctor now or seek immediate medical care if:  · You have severe pain or swelling in your belly. · You have a new or higher fever. · You cannot keep down fluids or medicines. · You have new pain that gets worse when you move or cough. Watch closely for changes in your health, and be sure to contact your doctor if:  · The symptoms you had when you first started feeling sick come back. · You do not get better as expected. Where can you learn more? Go to http://jeanette-albaro.info/. Enter H901 in the search box to learn more about \"Diverticulitis: Care Instructions. \"  Current as of: August 9, 2016  Content Version: 11.3  © 8943-5410 [employer]. Care instructions adapted under license by [employer] (which disclaims liability or warranty for this information). If you have questions about a medical condition or this instruction, always ask your healthcare professional. [place] [address] any warranty or liability for your use of this information. Information obtained by :  I understand that if any problems occur once I am at home I am to contact my physician. I understand and acknowledge receipt of the instructions indicated above.                                                                                                                                            Physician's or R.N.'s Signature                                                                  Date/Time                                                                                                                                              Patient or Representative Signature                                                          Date/Time

## 2017-06-23 NOTE — PROGRESS NOTES
General Surgery Daily Progress Note    Patient: Gómez Brewer MRN: [id number]  SSN: [id number]    YOB: 1965  Age: 47 y.o.   Sex: female      Admit Date: 6/18/2017    Subjective:   No complaints, tolerating diet     Current Facility-Administered Medications   Medication Dose Route Frequency    amoxicillin-clavulanate (AUGMENTIN) 875-125 mg per tablet 1 Tab  1 Tab Oral Q12H    lactobac ac& pc-s.therm-b.anim (VANNA Q/RISAQUAD)  1 Cap Oral DAILY    enoxaparin (LOVENOX) injection 40 mg  40 mg SubCUTAneous Q24H    sodium chloride (NS) flush 5-10 mL  5-10 mL IntraVENous Q8H    sodium chloride (NS) flush 5-10 mL  5-10 mL IntraVENous PRN    acetaminophen (TYLENOL) tablet 650 mg  650 mg Oral Q4H PRN    morphine injection 2 mg  2 mg IntraVENous Q4H PRN    naloxone (NARCAN) injection 0.4 mg  0.4 mg IntraVENous PRN    diphenhydrAMINE (BENADRYL) injection 12.5 mg  12.5 mg IntraVENous Q4H PRN    prochlorperazine (COMPAZINE) injection 5 mg  5 mg IntraVENous Q8H PRN    LORazepam (ATIVAN) tablet 1 mg  1 mg Oral BID PRN        Objective:      06/21 1901 - 06/23 0700  In: 1030 [I.V.:1030]  Out: 600 [Urine:600]  Patient Vitals for the past 8 hrs:   BP Temp Pulse Resp SpO2   06/23/17 0729 122/90 98.1 °F (36.7 °C) 67 18 98 %   06/23/17 0448 114/80 98.2 °F (36.8 °C) 74 18 99 %       Physical Exam:  General: Alert, cooperative, NAD  Lungs: Unlabored  Heart:  Regular rate and rhythm  Abdomen: Soft, non-tender  Extremities: Warm, moves all, no edema  Skin:  Warm and dry, no rash    Labs:   Recent Labs      06/23/17   0450   WBC  8.0   HGB  12.9   HCT  38.5   PLT  270     Recent Labs      06/23/17   0450  06/22/17   0128   NA  141  145   K  3.6  3.4*   CL  107  109*   CO2  26  27   GLU  89  128*   BUN  4*  2*   CREA  0.87  0.77   CA  9.2  8.6   MG   --   1.9   PHOS   --   3.2       Assessment / Plan:   · Acute sigmoid diverticulitis w/ contained perforation  · C diff negative  · OK for discharge from our perspective on PO ABX. Needs outpatient colonoscopy in 6 weeks. F/u with Dr. Pineda Hence PRN.

## 2017-06-23 NOTE — PROGRESS NOTES
[facility]. Patient A&O x4, no s/sxs of distress noted and no complaints of pain. Patient very pleasant; no requests made at this time. Family at bedside. Will continue to monitor.

## 2017-06-23 NOTE — DISCHARGE SUMMARY
Physician Interim Discharge Summary     Patient ID:  Shoshana Samaniego  [id number]  23 y.o.  1965    Admit date: 6/18/2017    Discharge date and time: TBD    Hospital Course: This is an interim summary and covers the hospitalization from 6/20 to 6/22/2017. For any preceding portion of the patient's hospitalization, please see my partner's notes. Ms. Sam Polk is a 46 yo WF w/ no significant PMH p/w abdominal pain, found to have acute diverticulitis       Diverticulitis of sigmoid colon / Abdominal pain, acute, left lower quadrant: CT a/p from 6/18 reviewed, showing inflammatory stranding in the deep pelvis adjacent to the sigmoid colon with colonic wall thickening and minimal loculated fluid in the deep pelvis. Findings are most consistent with sigmoid colonic diverticulitis. Inflammatory changes in the right lower quadrant are less conspicuous. ADAT to regular diet. Anticipate d/c tomorrow on PO abx for 10-14 days total tx. On pip/tazo. Will need colonoscopy in 4-6 weeks after acute illness to r/o colon CA. Discussed with pt        See daily note for details. Final discharge summary will be done by the discharging physician.        Signed:  Kalia Gonzalez MD  6/22/2017  8:47 PM

## 2017-06-23 NOTE — PROGRESS NOTES
Krystian Gordon [facility]  [address] [facility], [place], [address] [place] [address]  [phone]      Medical Progress Note      NAME: Jazmin Mai   :  1965  MRM:  [id number]    Date/Time: 2017  2:19 PM       Assessment and Plan:     Diverticulitis of sigmoid colon / Abdominal pain, acute, left lower quadrant / Diarrhea - CT  showed sigmoid colon thickening and minimal loculated fluid in the deep pelvis, indicating sigmoid colonic diverticulitis. Dc on PO Augmentin to complete 10 days total. Will need colonoscopy in 4-6 weeks after acute illness to r/o colon CA.     Leukocytosis / Metabolic acidosis - POA likely from acute diverticulitis. Resolved. Subjective:     Chief Complaint: Pain gone, tolerated regular diet. ROS:  (bold if positive, if negative)      Tolerating PT   Tolerating Diet        Objective:     Last 24hrs VS reviewed since prior progress note.  Most recent are:    Visit Vitals    /75 (BP 1 Location: Right arm, BP Patient Position: At rest)    Pulse 76    Temp 98.2 °F (36.8 °C)    Resp 18    Ht 5' 9\" (1.753 m)    Wt 92.5 kg (204 lb)    SpO2 98%    BMI 30.13 kg/m2     SpO2 Readings from Last 6 Encounters:   17 98%        No intake or output data in the 24 hours ending 17 1419     Physical Exam:    Gen:  Obese, in no acute distress  HEENT:  Pink conjunctivae, PERRL, hearing intact to voice, moist mucous membranes  Neck:  Supple, without masses, thyroid non-tender  Resp:  No accessory muscle use, clear breath sounds without wheezes rales or rhonchi  Card:  No murmurs, normal S1, S2 without thrills, bruits or peripheral edema  Abd:  Soft, non-tender, non-distended, normoactive bowel sounds are present, no mass  Lymph:  No cervical or inguinal adenopathy  Musc:  No cyanosis or clubbing  Skin:  No rashes or ulcers, skin turgor is good  Neuro:  Cranial nerves are grossly intact, no focal motor weakness, follows commands appropriately  Psych:  Good insight, oriented to person, place and time, alert    Telemetry reviewed:   normal sinus rhythm  __________________________________________________________________  Medications Reviewed: (see below)  Medications:     Current Facility-Administered Medications   Medication Dose Route Frequency    amoxicillin-clavulanate (AUGMENTIN) 875-125 mg per tablet 1 Tab  1 Tab Oral Q12H    lactobac ac& pc-s.therm-b.anim (VANNA Q/RISAQUAD)  1 Cap Oral DAILY    enoxaparin (LOVENOX) injection 40 mg  40 mg SubCUTAneous Q24H    sodium chloride (NS) flush 5-10 mL  5-10 mL IntraVENous Q8H    sodium chloride (NS) flush 5-10 mL  5-10 mL IntraVENous PRN    acetaminophen (TYLENOL) tablet 650 mg  650 mg Oral Q4H PRN    morphine injection 2 mg  2 mg IntraVENous Q4H PRN    naloxone (NARCAN) injection 0.4 mg  0.4 mg IntraVENous PRN    diphenhydrAMINE (BENADRYL) injection 12.5 mg  12.5 mg IntraVENous Q4H PRN    prochlorperazine (COMPAZINE) injection 5 mg  5 mg IntraVENous Q8H PRN    LORazepam (ATIVAN) tablet 1 mg  1 mg Oral BID PRN        Lab Data Reviewed: (see below)  Lab Review:     Recent Labs      06/23/17 0450  06/22/17   0128  06/21/17   0559   WBC  8.0  9.2  10.5   HGB  12.9  11.9  12.9   HCT  38.5  35.8  37.4   PLT  270  262  249     Recent Labs      06/23/17   0450  06/22/17   0128  06/21/17   0559   NA  141  145  144   K  3.6  3.4*  4.0   CL  107  109*  109*   CO2  26  27  23   GLU  89  128*  119*   BUN  4*  2*  2*   CREA  0.87  0.77  0.71   CA  9.2  8.6  8.6   MG   --   1.9  2.0   PHOS   --   3.2  2.2*     No results found for: GLUCPOC  No results for input(s): PH, PCO2, PO2, HCO3, FIO2 in the last 72 hours. No results for input(s): INR in the last 72 hours.     No lab exists for component: INREXT  All Micro Results     Procedure Component Value Units Date/Time    C. DIFFICILE (DNA) [[id number]] Collected:  06/21/17 1545    Order Status:  Completed Specimen:  Stool Updated:  06/22/17 1457     C. difficile (DNA) NEGATIVE This specimen is negative for toxigenic C difficile by DNA amplification. Repeat testing is not recommended for confirmation, samples received within 7 days of this negative result will be rejected. I have reviewed notes of prior 24hr.     Other pertinent lab: none    Total time spent with patient: 30 2109 Edna Rudy discussed with: Patient, Care Manager, Nursing Staff and >50% of time spent in counseling and coordination of care    Discussed:  Care Plan and D/C Planning    Prophylaxis:  H2B/PPI    Disposition:  Home w/Family           ___________________________________________________    Attending Physician: Melody Engel MD

## 2017-06-23 NOTE — PROGRESS NOTES
1230. Discharge instructions reviewed with patient. Patient verbalized understanding. IV removed. Script for antibiotic handed to patient. Patient instructed to call when  arrives to take her home.

## 2017-06-23 NOTE — DISCHARGE SUMMARY
Physician Discharge Summary     Patient ID:  Lubna Disla  [id number]  47 y.o.  1965    Admit date: 6/18/2017    Discharge date and time: 6/23/2017    Admission Diagnoses: Diverticulitis of sigmoid colon    Discharge Diagnoses:    Principal Diagnosis   Diverticulitis of sigmoid colon                                             Other Diagnoses    Abdominal pain, acute, left lower quadrant (6/18/2017)    Leukocytosis (6/18/2017)    Hospital Course:   Diverticulitis of sigmoid colon / Abdominal pain, acute, left lower quadrant / Diarrhea - CT 6/18 showed sigmoid colon thickening and minimal loculated fluid in the deep pelvis, indicating sigmoid colonic diverticulitis. Dc on PO Augmentin to complete 9 days total. Will need colonoscopy in 4-6 weeks after acute illness to r/o colon CA.      Leukocytosis / Metabolic acidosis - POA likely from acute diverticulitis. Resolved.     PCP: Ninfa Brice MD    Consults: General Surgery    Significant Diagnostic Studies: See Hospital Course    Discharged home in improved condition.     Discharge Exam:   /75 (BP 1 Location: Right arm, BP Patient Position: At rest)    Pulse 76    Temp 98.2 °F (36.8 °C)    Resp 18    Ht 5' 9\" (1.753 m)    Wt 92.5 kg (204 lb)    SpO2 98%    BMI 30.13 kg/m2      Gen:  Obese, in no acute distress  HEENT:  Pink conjunctivae, PERRL, hearing intact to voice, moist mucous membranes  Neck:  Supple, without masses, thyroid non-tender  Resp:  No accessory muscle use, clear breath sounds without wheezes rales or rhonchi  Card:  No murmurs, normal S1, S2 without thrills, bruits or peripheral edema  Abd:  Soft, non-tender, non-distended, normoactive bowel sounds are present, no mass  Lymph:  No cervical or inguinal adenopathy  Musc:  No cyanosis or clubbing  Skin:  No rashes or ulcers, skin turgor is good  Neuro:  Cranial nerves are grossly intact, no focal motor weakness, follows commands appropriately  Psych:  Good insight, oriented to person, place and time, alert    Patient Instructions:   Current Discharge Medication List      START taking these medications    Details   amoxicillin-clavulanate (AUGMENTIN) 875-125 mg per tablet Take 1 Tab by mouth every twelve (12) hours for 2 days. Qty: 4 Tab, Refills: 0         CONTINUE these medications which have NOT CHANGED    Details   levonorgestrel-ethinyl estradiol (VIENVA) 0.1-20 mg-mcg tab Take 1 Tab by mouth daily. Activity: Activity as tolerated  Diet: Regular Diet, Increase noncaffeinated fluids, Low fat, Low cholesterol and bland for 2 weeks  Wound Care: None needed    Follow-up with your PCP and Dr Mary Mortensen in GI in a few weeks.   Follow-up tests/labs - none    Signed:  Elidia Diamond MD  6/23/2017  2:23 PM

## 2017-08-27 ENCOUNTER — APPOINTMENT (OUTPATIENT)
Dept: CT IMAGING | Age: 52
End: 2017-08-27
Attending: EMERGENCY MEDICINE
Payer: COMMERCIAL

## 2017-08-27 ENCOUNTER — HOSPITAL ENCOUNTER (EMERGENCY)
Age: 52
Discharge: ANOTHER HOSPITAL | End: 2017-08-27
Attending: EMERGENCY MEDICINE
Payer: COMMERCIAL

## 2017-08-27 ENCOUNTER — DOCUMENTATION ONLY (OUTPATIENT)
Dept: ONCOLOGY | Age: 52
End: 2017-08-27

## 2017-08-27 VITALS
RESPIRATION RATE: 16 BRPM | BODY MASS INDEX: 26.66 KG/M2 | OXYGEN SATURATION: 98 % | SYSTOLIC BLOOD PRESSURE: 122 MMHG | TEMPERATURE: 98.7 F | HEART RATE: 87 BPM | DIASTOLIC BLOOD PRESSURE: 79 MMHG | HEIGHT: 69 IN | WEIGHT: 180 LBS

## 2017-08-27 DIAGNOSIS — R10.31 ABDOMINAL PAIN, RLQ: Primary | ICD-10-CM

## 2017-08-27 LAB
ALBUMIN SERPL-MCNC: 3.4 G/DL (ref 3.5–5)
ALBUMIN/GLOB SERPL: 0.8 {RATIO} (ref 1.1–2.2)
ALP SERPL-CCNC: 118 U/L (ref 45–117)
ALT SERPL-CCNC: 60 U/L (ref 12–78)
ANION GAP SERPL CALC-SCNC: 10 MMOL/L (ref 5–15)
APPEARANCE UR: ABNORMAL
AST SERPL-CCNC: 21 U/L (ref 15–37)
BACTERIA URNS QL MICRO: NEGATIVE /HPF
BASOPHILS # BLD: 0 K/UL (ref 0–0.1)
BASOPHILS NFR BLD: 0 % (ref 0–1)
BILIRUB SERPL-MCNC: 0.8 MG/DL (ref 0.2–1)
BILIRUB UR QL: NEGATIVE
BUN SERPL-MCNC: 9 MG/DL (ref 6–20)
BUN/CREAT SERPL: 8 (ref 12–20)
CALCIUM SERPL-MCNC: 9.2 MG/DL (ref 8.5–10.1)
CHLORIDE SERPL-SCNC: 102 MMOL/L (ref 97–108)
CO2 SERPL-SCNC: 26 MMOL/L (ref 21–32)
COLOR UR: ABNORMAL
CREAT SERPL-MCNC: 1.15 MG/DL (ref 0.55–1.02)
EOSINOPHIL # BLD: 0 K/UL (ref 0–0.4)
EOSINOPHIL NFR BLD: 0 % (ref 0–7)
EPITH CASTS URNS QL MICRO: ABNORMAL /LPF
ERYTHROCYTE [DISTWIDTH] IN BLOOD BY AUTOMATED COUNT: 14.5 % (ref 11.5–14.5)
GLOBULIN SER CALC-MCNC: 4.1 G/DL (ref 2–4)
GLUCOSE SERPL-MCNC: 110 MG/DL (ref 65–100)
GLUCOSE UR STRIP.AUTO-MCNC: NEGATIVE MG/DL
HCG UR QL: NEGATIVE
HCT VFR BLD AUTO: 41.4 % (ref 35–47)
HGB BLD-MCNC: 13.7 G/DL (ref 11.5–16)
HGB UR QL STRIP: ABNORMAL
KETONES UR QL STRIP.AUTO: NEGATIVE MG/DL
LEUKOCYTE ESTERASE UR QL STRIP.AUTO: NEGATIVE
LIPASE SERPL-CCNC: 93 U/L (ref 73–393)
LYMPHOCYTES # BLD: 1.6 K/UL (ref 0.8–3.5)
LYMPHOCYTES NFR BLD: 8 % (ref 12–49)
MCH RBC QN AUTO: 28.5 PG (ref 26–34)
MCHC RBC AUTO-ENTMCNC: 33.1 G/DL (ref 30–36.5)
MCV RBC AUTO: 86.1 FL (ref 80–99)
MONOCYTES # BLD: 2 K/UL (ref 0–1)
MONOCYTES NFR BLD: 10 % (ref 5–13)
NEUTS SEG # BLD: 15.4 K/UL (ref 1.8–8)
NEUTS SEG NFR BLD: 82 % (ref 32–75)
NITRITE UR QL STRIP.AUTO: NEGATIVE
PH UR STRIP: 6 [PH] (ref 5–8)
PLATELET # BLD AUTO: 275 K/UL (ref 150–400)
POTASSIUM SERPL-SCNC: 4.5 MMOL/L (ref 3.5–5.1)
PROT SERPL-MCNC: 7.5 G/DL (ref 6.4–8.2)
PROT UR STRIP-MCNC: 30 MG/DL
RBC # BLD AUTO: 4.81 M/UL (ref 3.8–5.2)
RBC #/AREA URNS HPF: ABNORMAL /HPF (ref 0–5)
SODIUM SERPL-SCNC: 138 MMOL/L (ref 136–145)
SP GR UR REFRACTOMETRY: 1.02 (ref 1–1.03)
UA: UC IF INDICATED,UAUC: ABNORMAL
UROBILINOGEN UR QL STRIP.AUTO: 0.2 EU/DL (ref 0.2–1)
WBC # BLD AUTO: 19 K/UL (ref 3.6–11)
WBC URNS QL MICRO: ABNORMAL /HPF (ref 0–4)

## 2017-08-27 PROCEDURE — 96365 THER/PROPH/DIAG IV INF INIT: CPT

## 2017-08-27 PROCEDURE — 80053 COMPREHEN METABOLIC PANEL: CPT | Performed by: EMERGENCY MEDICINE

## 2017-08-27 PROCEDURE — 81001 URINALYSIS AUTO W/SCOPE: CPT | Performed by: EMERGENCY MEDICINE

## 2017-08-27 PROCEDURE — 96375 TX/PRO/DX INJ NEW DRUG ADDON: CPT

## 2017-08-27 PROCEDURE — 74011250636 HC RX REV CODE- 250/636: Performed by: EMERGENCY MEDICINE

## 2017-08-27 PROCEDURE — 81025 URINE PREGNANCY TEST: CPT

## 2017-08-27 PROCEDURE — 74011000258 HC RX REV CODE- 258: Performed by: EMERGENCY MEDICINE

## 2017-08-27 PROCEDURE — 99285 EMERGENCY DEPT VISIT HI MDM: CPT

## 2017-08-27 PROCEDURE — 96376 TX/PRO/DX INJ SAME DRUG ADON: CPT

## 2017-08-27 PROCEDURE — 74011636320 HC RX REV CODE- 636/320: Performed by: EMERGENCY MEDICINE

## 2017-08-27 PROCEDURE — 96361 HYDRATE IV INFUSION ADD-ON: CPT

## 2017-08-27 PROCEDURE — 74177 CT ABD & PELVIS W/CONTRAST: CPT

## 2017-08-27 PROCEDURE — 74011636320 HC RX REV CODE- 636/320: Performed by: RADIOLOGY

## 2017-08-27 PROCEDURE — 83690 ASSAY OF LIPASE: CPT | Performed by: EMERGENCY MEDICINE

## 2017-08-27 PROCEDURE — 85025 COMPLETE CBC W/AUTO DIFF WBC: CPT | Performed by: EMERGENCY MEDICINE

## 2017-08-27 PROCEDURE — 36415 COLL VENOUS BLD VENIPUNCTURE: CPT | Performed by: EMERGENCY MEDICINE

## 2017-08-27 RX ORDER — HYDROMORPHONE HYDROCHLORIDE 1 MG/ML
0.5 INJECTION, SOLUTION INTRAMUSCULAR; INTRAVENOUS; SUBCUTANEOUS
Status: COMPLETED | OUTPATIENT
Start: 2017-08-27 | End: 2017-08-27

## 2017-08-27 RX ORDER — ONDANSETRON 2 MG/ML
4 INJECTION INTRAMUSCULAR; INTRAVENOUS
Status: COMPLETED | OUTPATIENT
Start: 2017-08-27 | End: 2017-08-27

## 2017-08-27 RX ORDER — OXYCODONE HYDROCHLORIDE 5 MG/1
5 CAPSULE ORAL
COMMUNITY

## 2017-08-27 RX ADMIN — IOPAMIDOL 100 ML: 755 INJECTION, SOLUTION INTRAVENOUS at 10:34

## 2017-08-27 RX ADMIN — SODIUM CHLORIDE 1000 ML: 900 INJECTION, SOLUTION INTRAVENOUS at 07:55

## 2017-08-27 RX ADMIN — HYDROMORPHONE HYDROCHLORIDE 0.5 MG: 1 INJECTION, SOLUTION INTRAMUSCULAR; INTRAVENOUS; SUBCUTANEOUS at 07:35

## 2017-08-27 RX ADMIN — ONDANSETRON 4 MG: 2 INJECTION INTRAMUSCULAR; INTRAVENOUS at 07:33

## 2017-08-27 RX ADMIN — PIPERACILLIN SODIUM,TAZOBACTAM SODIUM 3.38 G: 3; .375 INJECTION, POWDER, FOR SOLUTION INTRAVENOUS at 13:03

## 2017-08-27 RX ADMIN — HYDROMORPHONE HYDROCHLORIDE 0.5 MG: 1 INJECTION, SOLUTION INTRAMUSCULAR; INTRAVENOUS; SUBCUTANEOUS at 11:45

## 2017-08-27 RX ADMIN — DIATRIZOATE MEGLUMINE AND DIATRIZOATE SODIUM 30 ML: 660; 100 LIQUID ORAL; RECTAL at 08:25

## 2017-08-27 NOTE — ED PROVIDER NOTES
HPI Comments: 47 y.o. female with no significant past medical history who presents from home with chief complaint of abdominal pain. Pt reports fluctuating 7/10 RLQ abdominal pain for 8 weeks accompanied by recent abdominal swelling and an episode of nausea, vomiting, and pallor 4 hours ago. Per Pt's , Pt was dx w/ diverticulitis here 6 weeks ago. Pt was seen in [place] for continued abdominal pain 2 weeks ago, was dx w/ appendix cancer, and was then transferred to a larger facility for surgery. At the larger facility, Pt had exploratory surgery 10 which confirmed cancer, but \"no biopsies were taken and nothing was removed\". Pt's  states Pt's \"appendix is stuck to her colon\". He notes Pt is scheduled to see medical oncology Dr. Yannick Roque in 4 days, and is scheduled to see surgical oncology Dr. Geovani Chavez at [facility] in 2 weeks. Pt states her last BM was yesterday morning and was normal. Pt denies bloody/bilious emesis, diarrhea, constipation, and bloody/black stool. There are no other acute medical concerns at this time. PCP: Israel Antonio MD    Note written by Denisse Izquierdo, as dictated by Aleksandra Wade MD 7:23 AM    The history is provided by the patient and the spouse. History reviewed. No pertinent past medical history. History reviewed. No pertinent surgical history. History reviewed. No pertinent family history. Social History     Social History    Marital status:      Spouse name: N/A    Number of children: N/A    Years of education: N/A     Occupational History    Not on file. Social History Main Topics    Smoking status: Never Smoker    Smokeless tobacco: Never Used    Alcohol use Yes      Comment: occational    Drug use: No    Sexual activity: Not on file     Other Topics Concern    Not on file     Social History Narrative         ALLERGIES: Review of patient's allergies indicates no known allergies.     Review of Systems   Constitutional: Negative for fever. Gastrointestinal: Positive for abdominal pain, nausea and vomiting. Negative for blood in stool, constipation and diarrhea. Skin: Positive for pallor. All other systems reviewed and are negative.       Vitals:    08/27/17 0619   BP: 110/61   Pulse: 100   Resp: 16   Temp: 98.7 °F (37.1 °C)   SpO2: 97%   Weight: 81.6 kg (180 lb)   Height: 5' 9\" (1.753 m)            Physical Exam   Physical Examination: General appearance - alert, mild distress, oriented to person, place, and time and normal appearing weight  Eyes - pupils equal and reactive, extraocular eye movements intact  Neck - supple, no significant adenopathy  Chest - clear to auscultation, no wheezes, rales or rhonchi, symmetric air entry  Heart - normal rate, regular rhythm, normal S1, S2, no murmurs, rubs, clicks or gallops  Abdomen - mild distention, diffuse tenderness to palpation with rebound and guarding, no peritoneal signs  Back exam - full range of motion, no tenderness, palpable spasm or pain on motion  Neurological - alert, oriented, normal speech, no focal findings or movement disorder noted  Musculoskeletal - no joint tenderness, deformity or swelling  Extremities - peripheral pulses normal, no pedal edema, no clubbing or cyanosis  Skin - normal coloration and turgor, no rashes, no suspicious skin lesions noted  MDM  Number of Diagnoses or Management Options  Abdominal pain, RLQ:      Amount and/or Complexity of Data Reviewed  Clinical lab tests: ordered and reviewed  Tests in the radiology section of CPT®: ordered and reviewed  Decide to obtain previous medical records or to obtain history from someone other than the patient: yes  Obtain history from someone other than the patient: yes (spouse)  Review and summarize past medical records: yes  Discuss the patient with other providers: yes (General surgery, oncology, gyn oncology)  Independent visualization of images, tracings, or specimens: yes    Patient Progress  Patient progress: improved    ED Course       Procedures    CONSULT NOTE:  7:39 AM Jimmy Batres MD spoke with Dr. Luis Bills, Consult for Surgery. Discussed available diagnostic tests and clinical findings. He is in agreement with care plans as outlined. Dr. Luis Bills recommends treatment at facility with surgical oncology available. 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 Ca 125 was 183. No evidence of peritoneal or omental disease. They recommended chemotherapy prior to resection. 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 CONSULT NOTE:  10:01 AM Jimmy Batres MD spoke with Dr. Nella Bronson, Consult for Oncology. Pt has appt this week with Dr. Martínez Kauffman. Discussed available diagnostic tests and clinical findings. She is in agreement with care plans as outlined. Dr. Nella Bronson recommends consultation w/ GYN oncology. After discussion with pt and , they would like to go to [facility] for admission. CONSULT NOTE:  11:29 AM Jimmy Batres MD spoke with Dr. Frank Art at [facility], Consult for GYN oncology. Discussed available diagnostic tests and clinical findings. She is in agreement with care plans as outlined. Dr. Frank Art will admit Pt to her service at [facility].

## 2017-08-27 NOTE — PROGRESS NOTES
Heme/ONC on call  Call from ER about this pt not known to us  Pt was supposed to see Dr Marquis Argueta this week for ?  Appendiceal cancer  Records reviewed with ER doctor today shows pt has ovarian/ tubal cancer  Pt needs to see GYN/ONC  If admitted, would consult GYN/ONC for treatment  ER agrees  Pt does not need to see us MED/ONC

## 2017-08-27 NOTE — ED TRIAGE NOTES
Pt arrives with c/o right sided abd pain ongoing since yesterday with vomiting. Pt reports recent diagnosis of appendix CA. Pt took Oxycodone at 2100 with minimal relief.

## 2017-08-27 NOTE — ED NOTES
Bedside and Verbal shift change report given to Lakeisha RN  (oncoming nurse) by Keagan GONZALES  (offgoing nurse). Report included the following information SBAR and pending transfer out for oncology GYN services.